# Patient Record
Sex: MALE | Race: WHITE
[De-identification: names, ages, dates, MRNs, and addresses within clinical notes are randomized per-mention and may not be internally consistent; named-entity substitution may affect disease eponyms.]

---

## 2023-06-22 VITALS
SYSTOLIC BLOOD PRESSURE: 139 MMHG | DIASTOLIC BLOOD PRESSURE: 81 MMHG | OXYGEN SATURATION: 97 % | HEART RATE: 76 BPM | RESPIRATION RATE: 18 BRPM | TEMPERATURE: 97.9 F

## 2023-06-22 VITALS
DIASTOLIC BLOOD PRESSURE: 75 MMHG | HEART RATE: 83 BPM | OXYGEN SATURATION: 96 % | SYSTOLIC BLOOD PRESSURE: 134 MMHG | RESPIRATION RATE: 18 BRPM

## 2023-06-22 NOTE — PC.NURSE
Patient tolerated Mitomycin without difficulty, Banda was drained and removed, patient tolerated well. His vitals were obtained and stable, he was discharged home without any complaints.

## 2023-06-22 NOTE — PC.NURSE
1045: Patient is here for instillation of mitomycin, he previously has tolerated these well in the past. He denies concerns or complaints today. Banda was placed via sterile technique and tolerated well. Patient has Mitomycin and turning every 15 
minutes per physician order and is tolerating well.

## 2023-07-20 ENCOUNTER — HOSPITAL ENCOUNTER
Dept: HOSPITAL 101 - INF | Age: 83
Discharge: HOME | End: 2023-07-20
Payer: MEDICARE

## 2023-07-20 VITALS
RESPIRATION RATE: 18 BRPM | TEMPERATURE: 97.7 F | SYSTOLIC BLOOD PRESSURE: 143 MMHG | OXYGEN SATURATION: 97 % | HEART RATE: 90 BPM | DIASTOLIC BLOOD PRESSURE: 91 MMHG

## 2023-07-20 DIAGNOSIS — Z85.51: Primary | ICD-10-CM

## 2023-07-20 PROCEDURE — 51700 IRRIGATION OF BLADDER: CPT

## 2023-07-20 NOTE — PC.NURSE
Patient tolerated treatment well and denies any complaints. Mitomycin was drained into mack bag that was light purple in color and about 350 total cc in drainage bag. Mack was discontinued and patient tolerated well. Pt was reminded of home care 
instructions and he was discharged home without complaints.

## 2023-07-20 NOTE — PC.NURSE
Patient is here for intravesicular mitomycin, he denies any complaints. Vitals are stable. He tolerated insertion of mack well and had 100cc of yellow drainage in bag. Mitomycin was instilled at 1100, and double verified with GENNARO Swanson RN. He is 
instructed to turn every 15 minutes for 90 minutes per physician order. He is tolerating this well.

## 2023-07-26 ENCOUNTER — HOSPITAL ENCOUNTER
Dept: HOSPITAL 101 - INF | Age: 83
Discharge: HOME | End: 2023-07-26
Payer: MEDICARE

## 2023-07-26 DIAGNOSIS — C67.9: Primary | ICD-10-CM

## 2023-07-26 PROCEDURE — 51702 INSERT TEMP BLADDER CATH: CPT

## 2023-07-26 PROCEDURE — 51700 IRRIGATION OF BLADDER: CPT

## 2024-02-01 PROBLEM — I10 ESSENTIAL HYPERTENSION: Status: ACTIVE | Noted: 2024-02-01

## 2024-02-01 PROBLEM — C80.1 CANCER (MULTI): Status: ACTIVE | Noted: 2024-02-01

## 2024-02-01 PROBLEM — K44.9 HIATAL HERNIA: Status: ACTIVE | Noted: 2024-02-01

## 2024-02-01 PROBLEM — I50.31: Status: ACTIVE | Noted: 2024-02-01

## 2024-02-01 PROBLEM — I25.5 ISCHEMIC CARDIOMYOPATHY: Status: ACTIVE | Noted: 2024-02-01

## 2024-02-01 PROBLEM — R07.9 INTERMITTENT CHEST PAIN: Status: ACTIVE | Noted: 2024-02-01

## 2024-02-01 PROBLEM — R13.10 DYSPHAGIA: Status: ACTIVE | Noted: 2024-02-01

## 2024-02-01 PROBLEM — I49.3 PREMATURE VENTRICULAR CONTRACTIONS: Status: ACTIVE | Noted: 2024-02-01

## 2024-02-01 PROBLEM — I25.10 ATHEROSCLEROTIC HEART DISEASE OF NATIVE CORONARY ARTERY WITHOUT ANGINA PECTORIS: Status: ACTIVE | Noted: 2024-02-01

## 2024-02-01 PROBLEM — I25.2 HISTORY OF MI (MYOCARDIAL INFARCTION): Status: ACTIVE | Noted: 2024-02-01

## 2024-02-01 PROBLEM — E78.2 MIXED HYPERLIPIDEMIA: Status: ACTIVE | Noted: 2024-02-01

## 2024-02-01 RX ORDER — ATORVASTATIN CALCIUM 10 MG/1
1 TABLET, FILM COATED ORAL NIGHTLY
COMMUNITY
Start: 2015-09-03 | End: 2024-04-29

## 2024-02-01 RX ORDER — CARVEDILOL 6.25 MG/1
1 TABLET ORAL 2 TIMES DAILY
COMMUNITY
Start: 2021-06-28 | End: 2024-03-22

## 2024-02-01 RX ORDER — TIOTROPIUM BROMIDE 18 UG/1
CAPSULE ORAL; RESPIRATORY (INHALATION)
COMMUNITY
Start: 2015-08-17

## 2024-02-01 RX ORDER — MOMETASONE FUROATE AND FORMOTEROL FUMARATE DIHYDRATE 200; 5 UG/1; UG/1
1 AEROSOL RESPIRATORY (INHALATION)
COMMUNITY
Start: 2021-05-11

## 2024-03-12 ENCOUNTER — HOSPITAL ENCOUNTER
Dept: HOSPITAL 101 - PST | Age: 84
Discharge: HOME | End: 2024-03-12
Payer: MEDICARE

## 2024-03-12 VITALS
RESPIRATION RATE: 16 BRPM | HEART RATE: 79 BPM | DIASTOLIC BLOOD PRESSURE: 70 MMHG | OXYGEN SATURATION: 96 % | TEMPERATURE: 97.34 F | SYSTOLIC BLOOD PRESSURE: 128 MMHG

## 2024-03-12 DIAGNOSIS — Z01.812: ICD-10-CM

## 2024-03-12 DIAGNOSIS — N40.1: ICD-10-CM

## 2024-03-12 DIAGNOSIS — Z01.810: Primary | ICD-10-CM

## 2024-03-12 LAB
ADD MANUAL DIFF: NO
ANION GAP: 12.6
BLOOD UREA NITROGEN: 16 MG/DL (ref 7–18)
CALCIUM: 8.8 MG/DL (ref 8.5–10.1)
CARBON DIOXIDE: 25.7 MMOL/L (ref 21–32)
CHLORIDE: 105 MMOL/L (ref 98–107)
ESTIMATED GFR (AFRICAN AMERICA: >60 (ref 60–?)
ESTIMATED GFR (NON-AFRICAN AME: >60 (ref 60–?)
GLUCOSE: 103 MG/DL (ref 74–106)
HEMATOCRIT: 41.9 % (ref 42–54)
HEMOGLOBIN: 13.8 G/DL (ref 14–18)
IMMATURE GRANULOCYTES ABS AUTO: 0.03 10^3/UL (ref 0–0.03)
IMMATURE GRANULOCYTES PCT AUTO: 0.4 % (ref 0–0.5)
INR: 1
LYMPHOCYTES  ABSOLUTE AUTO: 1.4 10^3/UL (ref 1.2–3.8)
MCV RBC: 98.4 FL (ref 80–94)
MEAN CORPUSCULAR HEMOGLOBIN: 32.4 PG (ref 25.9–34)
MEAN CORPUSCULAR HGB CONC: 32.9 G/DL (ref 29.9–35.2)
PARTIAL THROMBOPLASTIN TIME: 28.2 SEC (ref 22.3–36.2)
PLATELET COUNT: 233 10^3/UL (ref 150–450)
POTASSIUM: 4.3 MMOL/L (ref 3.5–5.1)
PROTHROMBIN TIME: 10.6 SEC (ref 9–11.6)
RED BLOOD COUNT: 4.26 10^6/UL (ref 4.7–6.1)
SODIUM: 139 MMOL/L (ref 136–145)
WHITE BLOOD COUNT: 7.5 10^3/UL (ref 4–11)

## 2024-03-12 PROCEDURE — 36415 COLL VENOUS BLD VENIPUNCTURE: CPT

## 2024-03-12 PROCEDURE — 85730 THROMBOPLASTIN TIME PARTIAL: CPT

## 2024-03-12 PROCEDURE — 93005 ELECTROCARDIOGRAM TRACING: CPT

## 2024-03-12 PROCEDURE — 80048 BASIC METABOLIC PNL TOTAL CA: CPT

## 2024-03-12 PROCEDURE — 85610 PROTHROMBIN TIME: CPT

## 2024-03-12 PROCEDURE — 85025 COMPLETE CBC W/AUTO DIFF WBC: CPT

## 2024-03-12 PROCEDURE — 71046 X-RAY EXAM CHEST 2 VIEWS: CPT

## 2024-03-12 NOTE — ECG_ITS
[facility] 
                                        
                                       Test Date:    2024 
Pat Name:     Donte Castellanos            Department:    
Patient ID:   [id number]               Room:         - 
Gender:       Male                     Technician:    
:          1940               Requested By: OLVIN MELENDREZ 
Order Number: [id number]              Reading MD:   ADIEL TRUJILLO 
                                 Measurements 
Intervals                              Axis           
Rate:         71                       P:            91 
CO:           236                      QRS:          -81 
QRSD:         98                       T:            50 
QT:           382                                     
QTc:          416                                     
                           Interpretive Statements 
SINUS RHYTHM WITH FIRST DEGREE AV BLOCK 
MARKED LEFT AXIS DEVIATION [QRS AXIS < -30] 
No previous ECG available for comparison 
Electronically Signed On 3- 22:36:07 EDT by ADIEL TRUJILLO

## 2024-03-12 NOTE — XR_ITS
The [address] [address] 
     [phone] 
  
  
Patient Name: 
FELA HERNANDEZ 
  
MRN: [id number]    YOB: 1940    Sex: M 
Assigned Patient Location: [facility] 
Current Patient Location: [facility] 
Accession/Order Number: [id number] 
Exam Date: 3/12/2024  13:39    Report Date: 3/12/2024  15:45 
  
At the request of: 
OLVIN MELENDREZ   
  
Procedure:  XR chest 2V 
  
EXAMINATION: XR chest 2V  
  
HISTORY: Preop exam 
  
COMPARISON: No relevant comparison available.  
  
TECHNIQUE: PA and lateral 
  
FINDINGS: 
LUNGS: Paucity of lung markings in the lung bases possibly representing  
emphysematous change. Scattered pulmonary nodules, size and density suggests  
granulomas 
VASCULATURE: No increased pulmonary vasculature.  
PLEURA: No pneumothorax, effusion, or pleural thickening.  
CARDIAC: No cardiomegaly or cardiac silhouette abnormality.  
MEDIASTINUM: No visible mass or adenopathy.  
BONES: No fracture or visible bone lesion.  
OTHER: Negative.  
  
ORDER #: [id number] XR/XR chest 2V  
IMPRESSION:   
   
No acute cardiopulmonary process  
   
   
Electronically authenticated by: KENYA LOMBARDI   Date: 3/12/2024  15:45

## 2024-03-14 ENCOUNTER — HOSPITAL ENCOUNTER (OUTPATIENT)
Age: 84
Discharge: HOME | End: 2024-03-14
Payer: MEDICARE

## 2024-03-14 VITALS
DIASTOLIC BLOOD PRESSURE: 77 MMHG | HEART RATE: 81 BPM | RESPIRATION RATE: 18 BRPM | SYSTOLIC BLOOD PRESSURE: 132 MMHG | OXYGEN SATURATION: 97 %

## 2024-03-14 VITALS
HEART RATE: 81 BPM | RESPIRATION RATE: 16 BRPM | OXYGEN SATURATION: 99 % | DIASTOLIC BLOOD PRESSURE: 70 MMHG | SYSTOLIC BLOOD PRESSURE: 127 MMHG

## 2024-03-14 VITALS
RESPIRATION RATE: 20 BRPM | DIASTOLIC BLOOD PRESSURE: 78 MMHG | SYSTOLIC BLOOD PRESSURE: 128 MMHG | HEART RATE: 82 BPM | OXYGEN SATURATION: 99 %

## 2024-03-14 VITALS
TEMPERATURE: 97.34 F | SYSTOLIC BLOOD PRESSURE: 150 MMHG | HEART RATE: 85 BPM | RESPIRATION RATE: 20 BRPM | DIASTOLIC BLOOD PRESSURE: 88 MMHG | OXYGEN SATURATION: 100 %

## 2024-03-14 VITALS
SYSTOLIC BLOOD PRESSURE: 119 MMHG | RESPIRATION RATE: 23 BRPM | OXYGEN SATURATION: 98 % | DIASTOLIC BLOOD PRESSURE: 72 MMHG | HEART RATE: 81 BPM

## 2024-03-14 VITALS
OXYGEN SATURATION: 97 % | RESPIRATION RATE: 15 BRPM | SYSTOLIC BLOOD PRESSURE: 129 MMHG | HEART RATE: 80 BPM | DIASTOLIC BLOOD PRESSURE: 76 MMHG

## 2024-03-14 VITALS — BODY MASS INDEX: 19 KG/M2

## 2024-03-14 VITALS
OXYGEN SATURATION: 99 % | RESPIRATION RATE: 16 BRPM | DIASTOLIC BLOOD PRESSURE: 70 MMHG | HEART RATE: 81 BPM | TEMPERATURE: 97.1 F | SYSTOLIC BLOOD PRESSURE: 127 MMHG

## 2024-03-14 VITALS
SYSTOLIC BLOOD PRESSURE: 124 MMHG | RESPIRATION RATE: 17 BRPM | HEART RATE: 81 BPM | DIASTOLIC BLOOD PRESSURE: 73 MMHG | OXYGEN SATURATION: 97 %

## 2024-03-14 VITALS
OXYGEN SATURATION: 98 % | RESPIRATION RATE: 15 BRPM | DIASTOLIC BLOOD PRESSURE: 71 MMHG | SYSTOLIC BLOOD PRESSURE: 121 MMHG | HEART RATE: 81 BPM

## 2024-03-14 VITALS
RESPIRATION RATE: 20 BRPM | SYSTOLIC BLOOD PRESSURE: 127 MMHG | DIASTOLIC BLOOD PRESSURE: 69 MMHG | OXYGEN SATURATION: 96 % | HEART RATE: 80 BPM

## 2024-03-14 VITALS
RESPIRATION RATE: 16 BRPM | SYSTOLIC BLOOD PRESSURE: 128 MMHG | OXYGEN SATURATION: 99 % | HEART RATE: 82 BPM | DIASTOLIC BLOOD PRESSURE: 78 MMHG

## 2024-03-14 DIAGNOSIS — N21.0: ICD-10-CM

## 2024-03-14 DIAGNOSIS — E78.5: ICD-10-CM

## 2024-03-14 DIAGNOSIS — C67.3: Primary | ICD-10-CM

## 2024-03-14 DIAGNOSIS — R06.02: ICD-10-CM

## 2024-03-14 DIAGNOSIS — R31.29: ICD-10-CM

## 2024-03-14 DIAGNOSIS — I50.9: ICD-10-CM

## 2024-03-14 DIAGNOSIS — Z85.51: ICD-10-CM

## 2024-03-14 DIAGNOSIS — I11.0: ICD-10-CM

## 2024-03-14 DIAGNOSIS — N40.1: ICD-10-CM

## 2024-03-14 DIAGNOSIS — Z87.440: ICD-10-CM

## 2024-03-14 DIAGNOSIS — J44.9: ICD-10-CM

## 2024-03-14 DIAGNOSIS — Z79.01: ICD-10-CM

## 2024-03-14 DIAGNOSIS — Z87.891: ICD-10-CM

## 2024-03-14 PROCEDURE — 36415 COLL VENOUS BLD VENIPUNCTURE: CPT

## 2024-03-14 PROCEDURE — 99999: CPT

## 2024-03-14 PROCEDURE — 88307 TISSUE EXAM BY PATHOLOGIST: CPT

## 2024-03-14 PROCEDURE — 52234 CYSTOSCOPY AND TREATMENT: CPT

## 2024-03-14 NOTE — PM.URSON
Urology Surgery Operative Note
Operative Note
Procedure Date: 03/14/24
Time Out Performed: yes
Pre-op Diagnosis: Recurrent bladder tumors
Post-op Diagnosis: same as pre-op
Procedures performed: 1.  Cystoscopy. 
2.  Transurethral resection of bladder tumor approximately 2 cm
Anesthesia: EMILY
Primary Surgeon: Ricky Orellana
Complications: 
None
Estimated blood loss (mL): 2
Specimens: 
Bladder tumor
Drains: 
18 [place] Banda catheter to the bladder
Indications for Procedures: 
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  On his last endoscopy he was found to have a 2 cm tumor on the anterior left wall.  He now presents for TURBT.  He has signed an informed consent for these procedures after 
risks were explained.
Detailed description of Procedure: 
The patient was brought to the operating room and placed on the operating room table in the supine position. SCDs were placed on the lower extremities and turned on and functioning during the entire case. Timeout was done by all parties in the room. 
We all agreed upon the patient's identification and the planned procedures for this patient. Genn. anesthesia was then administered. The patient was then repositioned into the modified dorsal lithotomy position. All pressure points were 
satisfactorily padded. Genitalia were sterilely prepped and draped in usual fashion.  I started by passing a 26 [place] Olympus resectoscope with a standard bipolar loop electrode per urethra and into the bladder.  The anterior urethra was normal.  
Prostatic urethra showed by lobar hypertrophy.  Panendoscopy in the bladder showed the 1 noted tumor on office cystoscopy on the anterior left-sided wall.  There were diverticuli diffusely.  No other tumors were noted.

I then began to resect the tumor.  I did it in a staccato fashion due to his thin-walled bladder.  The entire tumor was removed and sent for permanent sections.  The resection bed was fulgurated with the loop electrode.  Upon completion, there was 
no evidence of bleeding.  There were no other tumors noted in the bladder.  The scope was then removed.  I then placed an 18 [place] Banda in the bladder.  It drained clear.  The anesthetic was then reversed.  He was then transferred to a [facility] bed 
and wheeled to PACU in stable condition.

## 2024-03-14 NOTE — P.URON_ITS
Urology Surgery Operative Note    
Operative Note    
Procedure Date: 03/14/24    
Time Out Performed: yes    
Pre-op Diagnosis: Recurrent bladder tumors    
Post-op Diagnosis: same as pre-op    
Procedures performed: 1.  Cystoscopy.     
2.  Transurethral resection of bladder tumor approximately 2 cm    
Anesthesia: EMILY    
Primary Surgeon: Ricky Orelalna    
Complications:     
None    
Estimated blood loss (mL): 2    
Specimens:     
Bladder tumor    
Drains:     
18 [place] Banda catheter to the bladder    
Indications for Procedures:     
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  On   
his last endoscopy he was found to have a 2 cm tumor on the anterior left wall.   
He now presents for TURBT.  He has signed an informed consent for these   
procedures after risks were explained.    
Detailed description of Procedure:     
The patient was brought to the operating room and placed on the operating room   
table in the supine position. SCDs were placed on the lower extremities and   
turned on and functioning during the entire case. Timeout was done by all   
parties in the room. We all agreed upon the patient's identification and the   
planned procedures for this patient. Genn. anesthesia was then administered. The  
patient was then repositioned into the modified dorsal lithotomy position. All   
pressure points were satisfactorily padded. Genitalia were sterilely prepped and  
draped in usual fashion.  I started by passing a 26 [place] Olympus resectoscope   
with a standard bipolar loop electrode per urethra and into the bladder.  The   
anterior urethra was normal.  Prostatic urethra showed by lobar hypertrophy.    
Panendoscopy in the bladder showed the 1 noted tumor on office cystoscopy on the  
anterior left-sided wall.  There were diverticuli diffusely.  No other tumors   
were noted.    
    
I then began to resect the tumor.  I did it in a staccato fashion due to his   
thin-walled bladder.  The entire tumor was removed and sent for permanent   
sections.  The resection bed was fulgurated with the loop electrode.  Upon   
completion, there was no evidence of bleeding.  There were no other tumors noted  
in the bladder.  The scope was then removed.  I then placed an 18 [place] Banda   
in the bladder.  It drained clear.  The anesthetic was then reversed.  He was   
then transferred to a [facility] bed and wheeled to PACU in stable condition.

## 2024-04-05 ENCOUNTER — TELEPHONE (OUTPATIENT)
Dept: CARDIOLOGY | Facility: CLINIC | Age: 84
End: 2024-04-05
Payer: MEDICARE

## 2024-04-05 NOTE — TELEPHONE ENCOUNTER
Left patient voicemail advising appointment with Dr. Kathy Farooq MD has been canceled as provider will not be in office. New appointment has been made: 4/29 @ 1:15 - Asked patient to return call if this will not work.

## 2024-04-08 ENCOUNTER — APPOINTMENT (OUTPATIENT)
Dept: CARDIOLOGY | Facility: CLINIC | Age: 84
End: 2024-04-08
Payer: MEDICARE

## 2024-04-26 DIAGNOSIS — E78.2 MIXED HYPERLIPIDEMIA: ICD-10-CM

## 2024-04-29 ENCOUNTER — OFFICE VISIT (OUTPATIENT)
Dept: CARDIOLOGY | Facility: CLINIC | Age: 84
End: 2024-04-29
Payer: MEDICARE

## 2024-04-29 VITALS
WEIGHT: 118 LBS | SYSTOLIC BLOOD PRESSURE: 138 MMHG | HEART RATE: 68 BPM | DIASTOLIC BLOOD PRESSURE: 80 MMHG | BODY MASS INDEX: 19.05 KG/M2

## 2024-04-29 DIAGNOSIS — I25.10 ATHEROSCLEROSIS OF NATIVE CORONARY ARTERY WITHOUT ANGINA PECTORIS, UNSPECIFIED WHETHER NATIVE OR TRANSPLANTED HEART: ICD-10-CM

## 2024-04-29 DIAGNOSIS — Z87.891 FORMER SMOKER: ICD-10-CM

## 2024-04-29 DIAGNOSIS — R07.9 INTERMITTENT CHEST PAIN: ICD-10-CM

## 2024-04-29 DIAGNOSIS — I25.5 ISCHEMIC CARDIOMYOPATHY: ICD-10-CM

## 2024-04-29 DIAGNOSIS — I10 ESSENTIAL HYPERTENSION: ICD-10-CM

## 2024-04-29 DIAGNOSIS — E78.2 MIXED HYPERLIPIDEMIA: ICD-10-CM

## 2024-04-29 PROCEDURE — 3079F DIAST BP 80-89 MM HG: CPT | Performed by: INTERNAL MEDICINE

## 2024-04-29 PROCEDURE — 3075F SYST BP GE 130 - 139MM HG: CPT | Performed by: INTERNAL MEDICINE

## 2024-04-29 PROCEDURE — 1160F RVW MEDS BY RX/DR IN RCRD: CPT | Performed by: INTERNAL MEDICINE

## 2024-04-29 PROCEDURE — 1159F MED LIST DOCD IN RCRD: CPT | Performed by: INTERNAL MEDICINE

## 2024-04-29 PROCEDURE — 99214 OFFICE O/P EST MOD 30 MIN: CPT | Performed by: INTERNAL MEDICINE

## 2024-04-29 RX ORDER — CARVEDILOL 6.25 MG/1
6.25 TABLET ORAL 2 TIMES DAILY
Qty: 180 TABLET | Refills: 0 | Status: SHIPPED | OUTPATIENT
Start: 2024-04-29

## 2024-04-29 RX ORDER — ATORVASTATIN CALCIUM 10 MG/1
10 TABLET, FILM COATED ORAL NIGHTLY
Qty: 90 TABLET | Refills: 3 | Status: SHIPPED | OUTPATIENT
Start: 2024-04-29 | End: 2025-04-29

## 2024-04-29 NOTE — LETTER
June 18, 2024     Saumya Griffith, APRN-CNP  [address] [facility] [address]    Patient: Wilson Suresh   YOB: 1940   Date of Visit: 4/29/2024       Dear Dr. Saumya Griffith, APRN-CNP:    Thank you for referring Wilson Suresh to me for evaluation. Below are my notes for this consultation.  If you have questions, please do not hesitate to call me. I look forward to following your patient along with you.       Sincerely,     Kathy Farooq MD      CC: No Recipients  ______________________________________________________________________________________    1 year follow-up    Subjective :   Patient with mild nonocclusive CAD, returns for 1 year follow-up, past cardiac history is as noted below, interval review of systems is negative for chest discomfort pressure tightness heaviness palpitations lightheadedness orthopnea paroxysmal nocturnal dyspnea dependent edema or claudication TIA or CVA type symptoms or bleeding diathesis        History so Far :   • Atherosclerotic heart disease of native coronary artery without angina pectoris (414.01)  (I25.10)   • 2002 Cardiac cath   mLAD 25%   CX 20%   RCA normal   Daily activity 4 METs without concerning symptoms   • Normal cardiac ejection fraction   • Nov 2021 Echo   LVEF 55-60%   MR mild   Pulm Htn moderate   • Essential hypertension (401.9) (I10)   • Low BP in office   dizziness   will reduce coreg   • Mixed hyperlipidemia (272.2) (E78.2)   • Moderate intensity statin   Will be getting annual wellness labs   • Body mass index (BMI) of 19.9 or less in adult (Z68.1)    Objective   Wt Readings from Last 3 Encounters:   04/29/24 53.5 kg (118 lb)   04/06/23 55.8 kg (123 lb)   10/04/22 55.3 kg (122 lb)            Vitals:    04/29/24 1326   BP: 138/80   BP Location: Right arm   Patient Position: Sitting   Pulse: 68   Weight: 53.5 kg (118 lb)                Physical Exam:    GENERAL APPEARANCE: in no acute distress.  CHEST: Symmetric and non-tender.  INTEGUMENT:  Skin warm and dry  HEENT: No gross abnormalities identified.No pallor or scleral icterus.  NECK: Supple, no JVD, no bruit.   NEURO/PSHCY: Alert and oriented x3; appropriate behavior and responses and responses  LUNGS: Clear to auscultation bilaterally; normal respiratory effort.  HEART: Rate and rhythm regular with no evident murmur; no gallop appreciated.   ABDOMEN: Soft, non tender.  MUSCULOSKELETAL: No gross deformities.  EXTREMITIES: Warm  There is no edema noted.    Meds:  Current Outpatient Medications   Medication Instructions   • aspirin-calcium carbonate 81 mg-300 mg calcium(777 mg) tablet oral   • atorvastatin (LIPITOR) 10 mg, oral, Nightly   • carvedilol (COREG) 6.25 mg, oral, 2 times daily   • mometasone-formoterol (Dulera) 200-5 mcg/actuation inhaler 1 puff, inhalation, 2 times daily RT   • tiotropium (Spiriva with HandiHaler) 18 mcg inhalation capsule inhalation, Daily RT          No Known Allergies    LABS:    Reviewed laboratory data from April 2024, hemoglobin 12.8 hematocrit 38  Sodium 140, potassium 4.0 creatinine 1.07 GFR greater than 60 hemoglobin A1c 6.1 liver enzymes are normal triglycerides 106, total cholesterol 101, LDL 35, HDL 45, total cholesterol to HDL ratio 2.2.      Patient Active Problem List    Diagnosis Date Noted   • BMI less than 19,adult 04/29/2024   • Former smoker 04/29/2024   • Acute heart failure with normal ejection fraction (Multi) 02/01/2024   • Atherosclerotic heart disease of native coronary artery without angina pectoris 02/01/2024   • Cancer (Multi) 02/01/2024   • Dysphagia 02/01/2024   • Essential hypertension 02/01/2024   • History of MI (myocardial infarction) 02/01/2024   • Hiatal hernia 02/01/2024   • Intermittent chest pain 02/01/2024   • Ischemic cardiomyopathy 02/01/2024   • Mixed hyperlipidemia 02/01/2024   • Premature ventricular contractions 02/01/2024                 Assessment:    1. Atherosclerosis of native coronary artery without angina pectoris,  unspecified whether native or transplanted heart  carvedilol (Coreg) 6.25 mg tablet      2. Essential hypertension  carvedilol (Coreg) 6.25 mg tablet      3. Ischemic cardiomyopathy  Comprehensive Metabolic Panel    Lipid Panel    carvedilol (Coreg) 6.25 mg tablet    Follow Up In Cardiology      4. Mixed hyperlipidemia  Lipid Panel      5. Intermittent chest pain        6. BMI less than 19,adult        7. Former smoker        Clinical decision making:    This patient is doing well clinically, blood pressure is at target, has not had any lightheadedness or symptomatic arterial hypotension, he does have a history of labile blood pressure.  Lipid profile is at target.  No changes in medications.   Follow up : 1 year    Comprehensive  profile and lipid profile prior to next visit  Cardiac medications were refilled.    Provider Attestation - Scribe documentation    All medical record entries made by the Scribe were at my direction and personally dictated by me. I have reviewed the chart and agree that the record accurately reflects my personal performance of the history, physical exam, discussion and plan.

## 2024-04-29 NOTE — PROGRESS NOTES
Subjective   Wilson Suresh is a 83 y.o. male       Chief Complaint    Annual Exam          HPI     Review of Systems   Cardiovascular:  Positive for chest pain.            Vitals:    04/29/24 1326   BP: 138/80   BP Location: Right arm   Patient Position: Sitting   Pulse: 68   Weight: 53.5 kg (118 lb)        Objective   Physical Exam    Allergies  Patient has no known allergies.     Current Medications    Current Outpatient Medications:     aspirin-calcium carbonate 81 mg-300 mg calcium(777 mg) tablet, Take by mouth., Disp: , Rfl:     atorvastatin (Lipitor) 10 mg tablet, Take 1 tablet (10 mg) by mouth once daily at bedtime., Disp: , Rfl:     carvedilol (Coreg) 6.25 mg tablet, TAKE 1 TABLET BY MOUTH TWICE  DAILY, Disp: 180 tablet, Rfl: 0    mometasone-formoterol (Dulera) 200-5 mcg/actuation inhaler, Inhale 1 puff 2 times a day., Disp: , Rfl:     tiotropium (Spiriva with HandiHaler) 18 mcg inhalation capsule, Place into inhaler and inhale once daily., Disp: , Rfl:                      Assessment/Plan   No diagnosis found.         Scribe Attestation  By signing my name below, I, *** , Scribe   attest that this documentation has been prepared under the direction and in the presence of Kathy Farooq MD.     Provider Attestation - Scribe documentation    All medical record entries made by the Scribe were at my direction and personally dictated by me. I have reviewed the chart and agree that the record accurately reflects my personal performance of the history, physical exam, discussion and plan.

## 2024-04-29 NOTE — PROGRESS NOTES
1 year follow-up    Subjective :   Patient with mild nonocclusive CAD, returns for 1 year follow-up, past cardiac history is as noted below, interval review of systems is negative for chest discomfort pressure tightness heaviness palpitations lightheadedness orthopnea paroxysmal nocturnal dyspnea dependent edema or claudication TIA or CVA type symptoms or bleeding diathesis        History so Far :    Atherosclerotic heart disease of native coronary artery without angina pectoris (414.01)  (I25.10)    2002 Cardiac cath   mLAD 25%   CX 20%   RCA normal   Daily activity 4 METs without concerning symptoms    Normal cardiac ejection fraction    Nov 2021 Echo   LVEF 55-60%   MR mild   Pulm Htn moderate    Essential hypertension (401.9) (I10)    Low BP in office   dizziness   will reduce coreg    Mixed hyperlipidemia (272.2) (E78.2)    Moderate intensity statin   Will be getting annual wellness labs    Body mass index (BMI) of 19.9 or less in adult (Z68.1)    Objective   Wt Readings from Last 3 Encounters:   04/29/24 53.5 kg (118 lb)   04/06/23 55.8 kg (123 lb)   10/04/22 55.3 kg (122 lb)            Vitals:    04/29/24 1326   BP: 138/80   BP Location: Right arm   Patient Position: Sitting   Pulse: 68   Weight: 53.5 kg (118 lb)                Physical Exam:    GENERAL APPEARANCE: in no acute distress.  CHEST: Symmetric and non-tender.  INTEGUMENT: Skin warm and dry  HEENT: No gross abnormalities identified.No pallor or scleral icterus.  NECK: Supple, no JVD, no bruit.   NEURO/PSHCY: Alert and oriented x3; appropriate behavior and responses and responses  LUNGS: Clear to auscultation bilaterally; normal respiratory effort.  HEART: Rate and rhythm regular with no evident murmur; no gallop appreciated.   ABDOMEN: Soft, non tender.  MUSCULOSKELETAL: No gross deformities.  EXTREMITIES: Warm  There is no edema noted.    Meds:  Current Outpatient Medications   Medication Instructions    aspirin-calcium carbonate 81 mg-300 mg  calcium(777 mg) tablet oral    atorvastatin (LIPITOR) 10 mg, oral, Nightly    carvedilol (COREG) 6.25 mg, oral, 2 times daily    mometasone-formoterol (Dulera) 200-5 mcg/actuation inhaler 1 puff, inhalation, 2 times daily RT    tiotropium (Spiriva with HandiHaler) 18 mcg inhalation capsule inhalation, Daily RT          No Known Allergies    LABS:    Reviewed laboratory data from April 2024, hemoglobin 12.8 hematocrit 38  Sodium 140, potassium 4.0 creatinine 1.07 GFR greater than 60 hemoglobin A1c 6.1 liver enzymes are normal triglycerides 106, total cholesterol 101, LDL 35, HDL 45, total cholesterol to HDL ratio 2.2.      Patient Active Problem List    Diagnosis Date Noted    BMI less than 19,adult 04/29/2024    Former smoker 04/29/2024    Acute heart failure with normal ejection fraction (Multi) 02/01/2024    Atherosclerotic heart disease of native coronary artery without angina pectoris 02/01/2024    Cancer (Multi) 02/01/2024    Dysphagia 02/01/2024    Essential hypertension 02/01/2024    History of MI (myocardial infarction) 02/01/2024    Hiatal hernia 02/01/2024    Intermittent chest pain 02/01/2024    Ischemic cardiomyopathy 02/01/2024    Mixed hyperlipidemia 02/01/2024    Premature ventricular contractions 02/01/2024                 Assessment:    1. Atherosclerosis of native coronary artery without angina pectoris, unspecified whether native or transplanted heart  carvedilol (Coreg) 6.25 mg tablet      2. Essential hypertension  carvedilol (Coreg) 6.25 mg tablet      3. Ischemic cardiomyopathy  Comprehensive Metabolic Panel    Lipid Panel    carvedilol (Coreg) 6.25 mg tablet    Follow Up In Cardiology      4. Mixed hyperlipidemia  Lipid Panel      5. Intermittent chest pain        6. BMI less than 19,adult        7. Former smoker        Clinical decision making:    This patient is doing well clinically, blood pressure is at target, has not had any lightheadedness or symptomatic arterial hypotension, he does  have a history of labile blood pressure.  Lipid profile is at target.  No changes in medications.   Follow up : 1 year    Comprehensive  profile and lipid profile prior to next visit  Cardiac medications were refilled.    Provider Attestation - Scribe documentation    All medical record entries made by the Scribe were at my direction and personally dictated by me. I have reviewed the chart and agree that the record accurately reflects my personal performance of the history, physical exam, discussion and plan.

## 2024-05-08 LAB
NON-UH HIE ALANINE AMINOTRANSFERASE: 15 U/L (ref 7–52)
NON-UH HIE ALBUMIN LEVEL: 3.7 G/DL (ref 3.5–5.7)
NON-UH HIE ALBUMIN/GLOBULIN RATIO: 1.7
NON-UH HIE ALKALINE PHOSPHATASE: 94 U/L (ref 34–104)
NON-UH HIE ANION GAP: 7.3 (ref 6–15)
NON-UH HIE ASPARTATE AMINO TRANSFERASE: 16 U/L (ref 13–39)
NON-UH HIE BILIRUBIN,TOTAL: 0.5 MG/DL (ref 0.3–1)
NON-UH HIE BLOOD UREA NITROGEN: 14 MG/DL (ref 7–25)
NON-UH HIE CALCIUM: 9.1 MG/DL (ref 8.6–10.3)
NON-UH HIE CARBON DIOXIDE: 27.9 MMOL/L (ref 21–31)
NON-UH HIE CHLORIDE: 109 MMOL/L (ref 98–107)
NON-UH HIE CHOL/HDL RATIO: 2
NON-UH HIE CHOLESTEROL: 104 MG/DL (ref 140–200)
NON-UH HIE CREATININE: 1.14 MG/DL (ref 0.7–1.3)
NON-UH HIE ESTIMATED GFR: > 60
NON-UH HIE GLOBULIN: 2.2 G/DL
NON-UH HIE GLUCOSE: 105 MG/DL (ref 70–100)
NON-UH HIE HDL CHOLESTEROL: 53 MG/DL (ref 23–92)
NON-UH HIE LDL CHOLESTEROL,CALCULATED: 27 MG/DL (ref 0–100)
NON-UH HIE POTASSIUM: 4.2 MMOL/L (ref 3.5–5.1)
NON-UH HIE SODIUM: 140 MMOL/L (ref 136–145)
NON-UH HIE TOTAL PROTEIN: 5.9 G/DL (ref 6.4–8.9)
NON-UH HIE TRIGLYCERIDE W/REFLEX: 122 MG/DL (ref 0–149)
NON-UH HIE VLDL CHOLESTEROL: 24 MG/DL

## 2024-12-13 NOTE — PATIENT INSTRUCTIONS
Please bring all medicines, vitamins, and herbal supplements with you when you come to the office.    Prescriptions will not be filled unless you are compliant with your follow up appointments or have a follow up appointment scheduled as per instruction of your physician. Refills should be requested at the time of your visit. Fall Prevention Education Given   
Statement Selected

## 2025-01-17 PROCEDURE — 93306 TTE W/DOPPLER COMPLETE: CPT | Performed by: INTERNAL MEDICINE

## 2025-01-23 NOTE — H&P (VIEW-ONLY)
HPI:   Wilson Suresh is a 84 y.o. 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  This patient presented with 6 months of dysphagia to solid food.  He lost about 10 pounds since then.  He is currently only tolerating soft diet.  He is not able to tolerate bread or meat.  Food stuck in the chest and required regurgitation.  At baseline, he could walk about 1 mile every day.  He denies some dyspnea exertion.  Patient has a laparoscopic paraesophageal repair with mesh at  in 2016 for incarcerated [facility].  He does have a history of MI with 4 stent placed in the 1980s.  He has lost to follow-up with cardiologist.  He denies any recent chest pain or chest pressure.  He had episode of TIA 20 years ago with complete regain neurologic function.  He was smoker for 40 years with 2 pack/day but quit 35 years ago.  He denies any recent fever, chill, chest pain, chest pressure, short of breath, nausea or emesis.    PMHx: per HPI  PSHx: hiatal hernia repair with absorbable mesh in 2016, PCI, open appendectomy, EGD  SHx: former smoker (80ppy quit 35 year ago), deny ETOH, or illicit drugs   FMHx: father has lung cancer. MI and CAD in family.    Current Outpatient Medications:     aspirin-calcium carbonate 81 mg-300 mg calcium(777 mg) tablet, Take by mouth., Disp: , Rfl:     atorvastatin (Lipitor) 10 mg tablet, Take 1 tablet (10 mg) by mouth once daily at bedtime., Disp: 90 tablet, Rfl: 3    carvedilol (Coreg) 6.25 mg tablet, Take 1 tablet (6.25 mg) by mouth 2 times a day., Disp: 180 tablet, Rfl: 0    mometasone-formoterol (Dulera) 200-5 mcg/actuation inhaler, Inhale 1 puff 2 times a day., Disp: , Rfl:     tiotropium (Spiriva with HandiHaler) 18 mcg inhalation capsule, Place into inhaler and inhale once daily., Disp: , Rfl:    No Known Allergies       ROS  General: weight  loss, negative for fever, chills, night sweat  Head: negative for severe headache, vision change, blurred vision,   CV: negative for chest pain, dizziness, lightheadedness   Pulm: negative for shortness of breath, dyspnea on exertion, hemoptysis  GI: negative for diarrhea, constipation, abdominal pain, nausea or vomiting, BRBPR  : negative for dysuria, hematuria, incontinence  Skin: negative for rash  Heme: negative for blood thinner, bleeding disorder or clotting disorder  Endo: negative for heat or cold intolerance, weight gain or weight loss  MSK: negative for rash, edema, weakness    PHYSICAL EXAM  Visit Vitals  /67   Pulse 84   Temp 35.4 °C (95.7 °F)     Constitution: well-developed thin appearing male sitting in chair in no acute distress  HEENT: NCAT, moist mucosal membrane, neck supple, no crepitus, sclera anicteric  Lymph nodes: no cervical or supraclavicular lymphadenopathy  Cardiac: RRR, normal S1, S2, no mrg  Pulmonary: normal air movement, CTAP, no wcr  Abdomen: soft, non-distended, non-tender, no rigidity, guarding or rebound tenderness, no splenohepatomegaly, abdominal scars well healed  Neuro: AOx3, CNII-XII grossly normal  Ext: warm, dry, no edema noted  Skin: dry, clean and intact  Psych: mood and affect wnl    Assessment and Plan:  This is a 84 y.o. male former smoker with new found GEJ cancer  I reviewed the CT scan from 12/19/24 and the PET/CT from 1/22/25. It showed large hiatal hernia with more than half the stomach herniated into the chest. There was mass in the gastric cardia from GEJ. This mass was strongly PET-avid. It appeared more than half of the stomach has involvement.  --diffuse emphysematous change of the lungs with one   --multiple mediastinal and hilar lymphadenopathy with calcification and PDG activities  I reviewed his pathology report from 1/8/25. The GEJ biopsy showed invasive adenocarcinoma, moderately differentiated  I reviewed the PFT from 1/17/25. It showed FEV1  94% predicted and DLCO  54% predicted   I reviewed the ECHO from 1/17/25. It showed EF of 50%, RV function is normal, RVSP 44 mmHg  I reviewed the labs from 11/20/24. It showed anemia with Hgb of 11.6 and albumin of 3.1    In my opinion, this patient presented with newly identified GE junction adenocarcinoma.  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  He also has a large recurrent hiatal hernia with the majority of stomach in the chest.  PET/CT does not show distant disease.  The hilar and mediastinal lymph nodes are likely from different disease process. Overall he is a frail gentleman with multiple comorbidities.  A total gastrectomy with esophagojejunostomy in the right chest is a major operation carrying significant comorbidities and mortality.  Especially given his previous hiatus operation with mesh, this operation could be very difficult.  In my opinion, at least he will need reevaluation of this cancer with repeat EGD and possible laparoscopic peritoneal washing to complete staging.  He will also need to see a cardiologist to obtain cardiac clearance given his history of MI and stents.  I will present his case to tumor board to further discussion of treatment plan.      I had a candid discussion with the patient and his family. I outlined the treatment plan as above. The patient consented to proceed.     Joseph Luna MD  Thoracic Surgeon  [facility]   of Medicine  [facility]  Office phone: [phone]  Fax: [phone]  Pager: [phone]    Amount of time spent:  -Prep time on date of patient encounter: 30 min  -Time spend with patient/family/caregiver: 40 min  -Additional time spent on patient care activities: 15 min  -Documentation time in medical record: 15  min  -Other time spent on date of patient encounter: 0 min  Total time: 100 min

## 2025-01-23 NOTE — PROGRESS NOTES
HPI:   Wilson Suresh is a 84 y.o. 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  This patient presented with 6 months of dysphagia to solid food.  He lost about 10 pounds since then.  He is currently only tolerating soft diet.  He is not able to tolerate bread or meat.  Food stuck in the chest and required regurgitation.  At baseline, he could walk about 1 mile every day.  He denies some dyspnea exertion.  Patient has a laparoscopic paraesophageal repair with mesh at  in 2016 for incarcerated PEH.  He does have a history of MI with 4 stent placed in the 1980s.  He has lost to follow-up with cardiologist.  He denies any recent chest pain or chest pressure.  He had episode of TIA 20 years ago with complete regain neurologic function.  He was smoker for 40 years with 2 pack/day but quit 35 years ago.  He denies any recent fever, chill, chest pain, chest pressure, short of breath, nausea or emesis.    PMHx: per HPI  PSHx: hiatal hernia repair with absorbable mesh in 2016, PCI, open appendectomy, EGD  SHx: former smoker (80ppy quit 35 year ago), deny ETOH, or illicit drugs   FMHx: father has lung cancer. MI and CAD in family.    Current Outpatient Medications:     aspirin-calcium carbonate 81 mg-300 mg calcium(777 mg) tablet, Take by mouth., Disp: , Rfl:     atorvastatin (Lipitor) 10 mg tablet, Take 1 tablet (10 mg) by mouth once daily at bedtime., Disp: 90 tablet, Rfl: 3    carvedilol (Coreg) 6.25 mg tablet, Take 1 tablet (6.25 mg) by mouth 2 times a day., Disp: 180 tablet, Rfl: 0    mometasone-formoterol (Dulera) 200-5 mcg/actuation inhaler, Inhale 1 puff 2 times a day., Disp: , Rfl:     tiotropium (Spiriva with HandiHaler) 18 mcg inhalation capsule, Place into inhaler and inhale once daily., Disp: , Rfl:    No Known Allergies       ROS  General: weight  loss, negative for fever, chills, night sweat  Head: negative for severe headache, vision change, blurred vision,   CV: negative for chest pain, dizziness, lightheadedness   Pulm: negative for shortness of breath, dyspnea on exertion, hemoptysis  GI: negative for diarrhea, constipation, abdominal pain, nausea or vomiting, BRBPR  : negative for dysuria, hematuria, incontinence  Skin: negative for rash  Heme: negative for blood thinner, bleeding disorder or clotting disorder  Endo: negative for heat or cold intolerance, weight gain or weight loss  MSK: negative for rash, edema, weakness    PHYSICAL EXAM  Visit Vitals  /67   Pulse 84   Temp 35.4 °C (95.7 °F)     Constitution: well-developed thin appearing male sitting in chair in no acute distress  HEENT: NCAT, moist mucosal membrane, neck supple, no crepitus, sclera anicteric  Lymph nodes: no cervical or supraclavicular lymphadenopathy  Cardiac: RRR, normal S1, S2, no mrg  Pulmonary: normal air movement, CTAP, no wcr  Abdomen: soft, non-distended, non-tender, no rigidity, guarding or rebound tenderness, no splenohepatomegaly, abdominal scars well healed  Neuro: AOx3, CNII-XII grossly normal  Ext: warm, dry, no edema noted  Skin: dry, clean and intact  Psych: mood and affect wnl    Assessment and Plan:  This is a 84 y.o. male former smoker with new found GEJ cancer  I reviewed the CT scan from 12/19/24 and the PET/CT from 1/22/25. It showed large hiatal hernia with more than half the stomach herniated into the chest. There was mass in the gastric cardia from GEJ. This mass was strongly PET-avid. It appeared more than half of the stomach has involvement.  --diffuse emphysematous change of the lungs with one   --multiple mediastinal and hilar lymphadenopathy with calcification and PDG activities  I reviewed his pathology report from 1/8/25. The GEJ biopsy showed invasive adenocarcinoma, moderately differentiated  I reviewed the PFT from 1/17/25. It showed FEV1  94% predicted and DLCO  54% predicted   I reviewed the ECHO from 1/17/25. It showed EF of 50%, RV function is normal, RVSP 44 mmHg  I reviewed the labs from 11/20/24. It showed anemia with Hgb of 11.6 and albumin of 3.1    In my opinion, this patient presented with newly identified GE junction adenocarcinoma.  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  He also has a large recurrent hiatal hernia with the majority of stomach in the chest.  PET/CT does not show distant disease.  The hilar and mediastinal lymph nodes are likely from different disease process. Overall he is a frail gentleman with multiple comorbidities.  A total gastrectomy with esophagojejunostomy in the right chest is a major operation carrying significant comorbidities and mortality.  Especially given his previous hiatus operation with mesh, this operation could be very difficult.  In my opinion, at least he will need reevaluation of this cancer with repeat EGD and possible laparoscopic peritoneal washing to complete staging.  He will also need to see a cardiologist to obtain cardiac clearance given his history of MI and stents.  I will present his case to tumor board to further discussion of treatment plan.      I had a candid discussion with the patient and his family. I outlined the treatment plan as above. The patient consented to proceed.     Joseph Luna MD  Thoracic Surgeon  [facility]   of Medicine  [facility]  Office phone: [phone]  Fax: [phone]  Pager: [phone]    Amount of time spent:  -Prep time on date of patient encounter: 30 min  -Time spend with patient/family/caregiver: 40 min  -Additional time spent on patient care activities: 15 min  -Documentation time in medical record: 15  min  -Other time spent on date of patient encounter: 0 min  Total time: 100 min

## 2025-01-24 ENCOUNTER — OFFICE VISIT (OUTPATIENT)
Dept: SURGERY | Facility: CLINIC | Age: 85
End: 2025-01-24
Payer: MEDICARE

## 2025-01-24 VITALS
WEIGHT: 112 LBS | OXYGEN SATURATION: 95 % | SYSTOLIC BLOOD PRESSURE: 146 MMHG | HEIGHT: 66 IN | HEART RATE: 84 BPM | TEMPERATURE: 95.7 F | DIASTOLIC BLOOD PRESSURE: 67 MMHG | BODY MASS INDEX: 18 KG/M2

## 2025-01-24 DIAGNOSIS — C16.0 ADENOCARCINOMA OF GASTROESOPHAGEAL JUNCTION (MULTI): Primary | ICD-10-CM

## 2025-01-24 PROCEDURE — 1036F TOBACCO NON-USER: CPT | Performed by: STUDENT IN AN ORGANIZED HEALTH CARE EDUCATION/TRAINING PROGRAM

## 2025-01-24 PROCEDURE — 3077F SYST BP >= 140 MM HG: CPT | Performed by: STUDENT IN AN ORGANIZED HEALTH CARE EDUCATION/TRAINING PROGRAM

## 2025-01-24 PROCEDURE — 1159F MED LIST DOCD IN RCRD: CPT | Performed by: STUDENT IN AN ORGANIZED HEALTH CARE EDUCATION/TRAINING PROGRAM

## 2025-01-24 PROCEDURE — 3078F DIAST BP <80 MM HG: CPT | Performed by: STUDENT IN AN ORGANIZED HEALTH CARE EDUCATION/TRAINING PROGRAM

## 2025-01-24 PROCEDURE — 99205 OFFICE O/P NEW HI 60 MIN: CPT | Performed by: STUDENT IN AN ORGANIZED HEALTH CARE EDUCATION/TRAINING PROGRAM

## 2025-01-24 PROCEDURE — 1160F RVW MEDS BY RX/DR IN RCRD: CPT | Performed by: STUDENT IN AN ORGANIZED HEALTH CARE EDUCATION/TRAINING PROGRAM

## 2025-01-24 PROCEDURE — 99215 OFFICE O/P EST HI 40 MIN: CPT | Performed by: STUDENT IN AN ORGANIZED HEALTH CARE EDUCATION/TRAINING PROGRAM

## 2025-01-24 PROCEDURE — G2211 COMPLEX E/M VISIT ADD ON: HCPCS | Performed by: STUDENT IN AN ORGANIZED HEALTH CARE EDUCATION/TRAINING PROGRAM

## 2025-01-24 ASSESSMENT — PATIENT HEALTH QUESTIONNAIRE - PHQ9
SUM OF ALL RESPONSES TO PHQ9 QUESTIONS 1 AND 2: 0
2. FEELING DOWN, DEPRESSED OR HOPELESS: NOT AT ALL
1. LITTLE INTEREST OR PLEASURE IN DOING THINGS: NOT AT ALL

## 2025-01-24 ASSESSMENT — ENCOUNTER SYMPTOMS
DEPRESSION: 0
LOSS OF SENSATION IN FEET: 0
OCCASIONAL FEELINGS OF UNSTEADINESS: 0

## 2025-01-27 ENCOUNTER — TELEPHONE (OUTPATIENT)
Dept: CARDIOLOGY | Facility: CLINIC | Age: 85
End: 2025-01-27
Payer: MEDICARE

## 2025-01-27 NOTE — TELEPHONE ENCOUNTER
Patient needs to have surgery ,not date, but POC is requested. See note in system from Dr. Luna. Visit is pending 1/31/2025 with Dr. Farooq    [phone] surgeon phone.

## 2025-01-29 ENCOUNTER — APPOINTMENT (OUTPATIENT)
Dept: HEMATOLOGY/ONCOLOGY | Facility: HOSPITAL | Age: 85
End: 2025-01-29
Payer: MEDICARE

## 2025-01-30 ENCOUNTER — TELEPHONE (OUTPATIENT)
Dept: CARDIOTHORACIC SURGERY | Facility: HOSPITAL | Age: 85
End: 2025-01-30
Payer: MEDICARE

## 2025-01-30 DIAGNOSIS — C16.0 ADENOCARCINOMA OF GASTROESOPHAGEAL JUNCTION (MULTI): Primary | ICD-10-CM

## 2025-01-31 ENCOUNTER — OFFICE VISIT (OUTPATIENT)
Dept: CARDIOLOGY | Facility: CLINIC | Age: 85
End: 2025-01-31
Payer: MEDICARE

## 2025-01-31 VITALS
BODY MASS INDEX: 18 KG/M2 | HEIGHT: 66 IN | SYSTOLIC BLOOD PRESSURE: 96 MMHG | WEIGHT: 112 LBS | DIASTOLIC BLOOD PRESSURE: 60 MMHG | HEART RATE: 91 BPM

## 2025-01-31 DIAGNOSIS — C16.0: ICD-10-CM

## 2025-01-31 DIAGNOSIS — E78.2 MIXED HYPERLIPIDEMIA: ICD-10-CM

## 2025-01-31 DIAGNOSIS — Z01.810 PRE-OPERATIVE CARDIOVASCULAR EXAMINATION: ICD-10-CM

## 2025-01-31 DIAGNOSIS — R63.4 WEIGHT LOSS, ABNORMAL: ICD-10-CM

## 2025-01-31 DIAGNOSIS — I10 ESSENTIAL HYPERTENSION: ICD-10-CM

## 2025-01-31 DIAGNOSIS — I44.0 FIRST DEGREE AV BLOCK: Primary | ICD-10-CM

## 2025-01-31 DIAGNOSIS — I25.10 ATHEROSCLEROSIS OF NATIVE CORONARY ARTERY OF NATIVE HEART WITHOUT ANGINA PECTORIS: ICD-10-CM

## 2025-01-31 DIAGNOSIS — Z87.891 FORMER SMOKER: ICD-10-CM

## 2025-01-31 PROCEDURE — 99214 OFFICE O/P EST MOD 30 MIN: CPT | Performed by: INTERNAL MEDICINE

## 2025-01-31 PROCEDURE — 3074F SYST BP LT 130 MM HG: CPT | Performed by: INTERNAL MEDICINE

## 2025-01-31 PROCEDURE — 1160F RVW MEDS BY RX/DR IN RCRD: CPT | Performed by: INTERNAL MEDICINE

## 2025-01-31 PROCEDURE — 1159F MED LIST DOCD IN RCRD: CPT | Performed by: INTERNAL MEDICINE

## 2025-01-31 PROCEDURE — 3078F DIAST BP <80 MM HG: CPT | Performed by: INTERNAL MEDICINE

## 2025-01-31 PROCEDURE — 93000 ELECTROCARDIOGRAM COMPLETE: CPT | Performed by: INTERNAL MEDICINE

## 2025-01-31 PROCEDURE — 1036F TOBACCO NON-USER: CPT | Performed by: INTERNAL MEDICINE

## 2025-01-31 PROCEDURE — G2211 COMPLEX E/M VISIT ADD ON: HCPCS | Performed by: INTERNAL MEDICINE

## 2025-01-31 RX ORDER — ATORVASTATIN CALCIUM 10 MG/1
10 TABLET, FILM COATED ORAL NIGHTLY
Qty: 90 TABLET | Refills: 1 | Status: SHIPPED | OUTPATIENT
Start: 2025-01-31 | End: 2026-01-31

## 2025-01-31 RX ORDER — CARVEDILOL 6.25 MG/1
6.25 TABLET ORAL 2 TIMES DAILY
Qty: 180 TABLET | Refills: 1 | Status: SHIPPED | OUTPATIENT
Start: 2025-01-31 | End: 2026-01-31

## 2025-01-31 RX ORDER — OXYCODONE HYDROCHLORIDE 5 MG/1
5 CAPSULE ORAL EVERY 4 HOURS PRN
COMMUNITY

## 2025-01-31 RX ORDER — PANTOPRAZOLE SODIUM 40 MG/1
40 TABLET, DELAYED RELEASE ORAL
COMMUNITY

## 2025-01-31 RX ORDER — ONDANSETRON 4 MG/1
4 TABLET, FILM COATED ORAL EVERY 8 HOURS PRN
COMMUNITY

## 2025-01-31 NOTE — LETTER
January 31, 2025     Joseph Luna MD  [id number]  [facility] [id number]    Patient: Wilson Suresh   YOB: 1940   Date of Visit: 1/31/2025       Dear Dr. Joseph Luna MD:    Thank you for referring Wilson Suresh to me for evaluation. Below are my notes for this consultation.  If you have questions, please do not hesitate to call me. I look forward to following your patient along with you.       Sincerely,     Kathy Farooq MD      CC: No Recipients  ______________________________________________________________________________________    Accompanied by daughter and wife to the office.  Daughter identifies herself as Aylin, and is a  by profession.  Here for preoperative cardiac risk assessment prior to PEG tube placement, scheduled for early next week.    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  Minus chemotherapy and radiation, 2 weeks of surgery.  This is what I gather from patient's daughter.  Has been losing weight.  Fatigue.  PEG tube is essential to maintain nutrition.  No anginal type symptoms  Denies shortness of breath  Does around 4 METS of activity daily  EKG was reviewed      12 point ROS is negative or non contributory except as noted.   History so Far :     • Atherosclerotic heart disease of native coronary artery without angina pectoris (414.01)  (I25.10)   • 2002 Cardiac cath   mLAD 25%   CX 20%   RCA normal   Daily activity 4 METs without concerning symptoms   • Normal cardiac ejection fraction   • Nov 2021 Echo   LVEF 55-60%   MR mild   Pulm Htn moderate   • Essential hypertension (401.9) (I10)   • Low BP in office   dizziness   will reduce coreg   • Mixed hyperlipidemia (272.2) (E78.2)   • Moderate intensity statin   Will be getting annual wellness labs   • Body mass index (BMI) of 19.9 or less in adult (Z68.1)    Echocardiogram 1/17/2025-LVEF 50% grade 1 diastolic dysfunction trace tricuspid regurgitation RVSP 44 mmHg normal  "chamber dimensions trace aortic and trace mitral regurgitation left atrial diameter 3.6 cm IVC normal     PSHx: hiatal hernia repair with absorbable mesh in 2016, PCI, open appendectomy, EGD     GE junction adenocarcinoma  Objective   Wt Readings from Last 3 Encounters:   01/31/25 50.8 kg (112 lb)   01/24/25 50.8 kg (112 lb)   04/29/24 53.5 kg (118 lb)            Vitals:    01/31/25 0910   BP: 96/60   BP Location: Left arm   Patient Position: Sitting   Pulse: 91   Weight: 50.8 kg (112 lb)   Height: 1.676 m (5' 6\")                Physical Exam:  Cachectic and ill-appearing  GENERAL APPEARANCE: in no acute distress.  CHEST: Symmetric and non-tender.  INTEGUMENT: Skin warm and dry  HEENT: No gross abnormalities identified.No pallor or scleral icterus.  NECK: Supple, no JVD, no bruit.   NEURO/PSHCY: Alert and oriented x3; appropriate behavior and responses and responses  LUNGS: Clear to auscultation bilaterally; normal respiratory effort.  HEART: Rate and rhythm regular with no evident murmur; no gallop appreciated.   ABDOMEN: Soft, non tender.  MUSCULOSKELETAL: No gross deformities.  EXTREMITIES: Warm  There is no edema noted.    Meds:  Current Outpatient Medications   Medication Instructions   • aspirin-calcium carbonate 81 mg-300 mg calcium(777 mg) tablet oral, As needed   • atorvastatin (LIPITOR) 10 mg, oral, Nightly   • carvedilol (COREG) 6.25 mg, oral, 2 times daily   • mometasone-formoterol (Dulera) 200-5 mcg/actuation inhaler 1 puff, 2 times daily RT   • ondansetron (ZOFRAN) 4 mg, Every 8 hours PRN   • oxyCODONE (OXY-IR) 5 mg, Every 4 hours PRN   • pantoprazole (PROTONIX) 40 mg, Daily before breakfast   • tiotropium (Spiriva with HandiHaler) 18 mcg inhalation capsule Daily RT          Allergies   Allergen Reactions   • Hydrocodone Syncope   • Hydrocodone-Acetaminophen Other     Other Reaction(s): Passed out - Not a true allergy - CAN HAVE TYLENOL             LABS:    November 2024-sodium 140 potassium 4.1 " creatinine 1 GFR greater than 60 albumin 3.1 liver enzymes normal total cholesterol 101 HDL 59 triglycerides 117 LDL 19 cholesterol to HDL 1.7             Hemoglobin A1c 6.1 average glucose 128 hemoglobin 11.6 hematocrit 35 platelets 212,000 chest x-ray emphysema      Patient Active Problem List    Diagnosis Date Noted   • Pre-operative cardiovascular examination 01/31/2025   • Weight loss, abnormal 01/31/2025   • First degree AV block 01/31/2025   • Carcinoma of cardio-esophageal junction (Multi) 01/24/2025   • BMI less than 19,adult 04/29/2024   • Former smoker 04/29/2024   • Acute heart failure with normal ejection fraction 02/01/2024   • Atherosclerotic heart disease of native coronary artery without angina pectoris 02/01/2024   • Cancer (Multi) 02/01/2024   • Dysphagia 02/01/2024   • Essential hypertension 02/01/2024   • History of MI (myocardial infarction) 02/01/2024   • Hiatal hernia 02/01/2024   • Intermittent chest pain 02/01/2024   • Ischemic cardiomyopathy 02/01/2024   • Mixed hyperlipidemia 02/01/2024   • Premature ventricular contractions 02/01/2024                 Assessment:    1. First degree AV block        2. Pre-operative cardiovascular examination  ECG 12 Lead      3. Atherosclerosis of native coronary artery of native heart without angina pectoris  Follow Up In Cardiology    atorvastatin (Lipitor) 10 mg tablet    ECG 12 Lead      4. Essential hypertension  carvedilol (Coreg) 6.25 mg tablet      5. Mixed hyperlipidemia  atorvastatin (Lipitor) 10 mg tablet      6. Former smoker        7. BMI less than 19,adult        8. Carcinoma of cardio-esophageal junction (Multi)        9. Weight loss, abnormal        Clinical decision making:  Patient has adenocarcinoma of the GE junction.  He has dysphagia, inability to swallow, and is losing weight.  He has first-degree AV block abnormal R wave progression, right axis deviation, no diagnostic ST-T abnormality compared to an EKG from 2016.  He does not have  unstable angina decompensated heart failure or clinical valvular heart disease.  Activity level is close to 4 METS.  In view of all of these issues, risk-benefit favors proceeding with PEG tube placement without further cardiac workup.  Patient has had remote PCI and stenting, they understand that additional cardiac workup would delay surgery, and a combined decision was made to proceed with surgery given the nature of the surgery, and the benefits of improving nutritional status.  Follow up : 6 months        Provider Attestation - Scribe documentation    All medical record entries made by the Scribe were at my direction and personally dictated by me. I have reviewed the chart and agree that the record accurately reflects my personal performance of the history, physical exam, discussion and plan.     Scribe Attestation  By signing my name below, I, Hope GUARDADO LPN  , Margie   attest that this documentation has been prepared under the direction and in the presence of Kathy Farooq MD.

## 2025-01-31 NOTE — TELEPHONE ENCOUNTER
I called patient today to discuss about the workup and the tumor board recommendation.  I presented him on GI tumor board yesterday.  We reviewed imaging of PET/CT, CT scan and EGD results which showed this tumor is most likely a gastric cancer or Siewert 3 GE junction adenocarcinoma.  There was concern for mediastinal lymphadenopathy with PET activity which indicating likely metastatic disease.  Also given his significant comorbidities and prior hiatal hernia repair was mesh, he is extremely high risk for total gastrectomy and EJ reconstruction.  The recommendation is to treat this tumor as metastatic gastric cancer with definitive chemotherapy.    Given patient's a dysphagia and weight lost, I recommend feeding tube placement for nutritional support.  I will arrange for this at [place] on Monday for EGD PEG with possible open jejunostomy.    Patient understood the plan and agreed to proceed.    Joseph Luna MD  Thoracic Surgeon  [facility]   of Medicine  [facility]  Office phone: [phone]  Fax: [phone]  Pager: [phone]

## 2025-01-31 NOTE — PREPROCEDURE INSTRUCTIONS

## 2025-01-31 NOTE — PROGRESS NOTES
"Accompanied by daughter and wife to the office.  Daughter identifies herself as Aylin, and is a  by profession.  Here for preoperative cardiac risk assessment prior to PEG tube placement, scheduled for early next week.    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  Minus chemotherapy and radiation, 2 weeks of surgery.  This is what I gather from patient's daughter.  Has been losing weight.  Fatigue.  PEG tube is essential to maintain nutrition.  No anginal type symptoms  Denies shortness of breath  Does around 4 METS of activity daily  EKG was reviewed      12 point ROS is negative or non contributory except as noted.   History so Far :      Atherosclerotic heart disease of native coronary artery without angina pectoris (414.01)  (I25.10)    2002 Cardiac cath   mLAD 25%   CX 20%   RCA normal   Daily activity 4 METs without concerning symptoms    Normal cardiac ejection fraction    Nov 2021 Echo   LVEF 55-60%   MR mild   Pulm Htn moderate    Essential hypertension (401.9) (I10)    Low BP in office   dizziness   will reduce coreg    Mixed hyperlipidemia (272.2) (E78.2)    Moderate intensity statin   Will be getting annual wellness labs    Body mass index (BMI) of 19.9 or less in adult (Z68.1)    Echocardiogram 1/17/2025-LVEF 50% grade 1 diastolic dysfunction trace tricuspid regurgitation RVSP 44 mmHg normal chamber dimensions trace aortic and trace mitral regurgitation left atrial diameter 3.6 cm IVC normal     PSHx: hiatal hernia repair with absorbable mesh in 2016, PCI, open appendectomy, EGD     GE junction adenocarcinoma  Objective   Wt Readings from Last 3 Encounters:   01/31/25 50.8 kg (112 lb)   01/24/25 50.8 kg (112 lb)   04/29/24 53.5 kg (118 lb)            Vitals:    01/31/25 0910   BP: 96/60   BP Location: Left arm   Patient Position: Sitting   Pulse: 91   Weight: 50.8 kg (112 lb)   Height: 1.676 m (5' 6\")                Physical Exam:  Cachectic and " ill-appearing  GENERAL APPEARANCE: in no acute distress.  CHEST: Symmetric and non-tender.  INTEGUMENT: Skin warm and dry  HEENT: No gross abnormalities identified.No pallor or scleral icterus.  NECK: Supple, no JVD, no bruit.   NEURO/PSHCY: Alert and oriented x3; appropriate behavior and responses and responses  LUNGS: Clear to auscultation bilaterally; normal respiratory effort.  HEART: Rate and rhythm regular with no evident murmur; no gallop appreciated.   ABDOMEN: Soft, non tender.  MUSCULOSKELETAL: No gross deformities.  EXTREMITIES: Warm  There is no edema noted.    Meds:  Current Outpatient Medications   Medication Instructions    aspirin-calcium carbonate 81 mg-300 mg calcium(777 mg) tablet oral, As needed    atorvastatin (LIPITOR) 10 mg, oral, Nightly    carvedilol (COREG) 6.25 mg, oral, 2 times daily    mometasone-formoterol (Dulera) 200-5 mcg/actuation inhaler 1 puff, 2 times daily RT    ondansetron (ZOFRAN) 4 mg, Every 8 hours PRN    oxyCODONE (OXY-IR) 5 mg, Every 4 hours PRN    pantoprazole (PROTONIX) 40 mg, Daily before breakfast    tiotropium (Spiriva with HandiHaler) 18 mcg inhalation capsule Daily RT          Allergies   Allergen Reactions    Hydrocodone Syncope    Hydrocodone-Acetaminophen Other     Other Reaction(s): Passed out - Not a true allergy - CAN HAVE TYLENOL             LABS:    November 2024-sodium 140 potassium 4.1 creatinine 1 GFR greater than 60 albumin 3.1 liver enzymes normal total cholesterol 101 HDL 59 triglycerides 117 LDL 19 cholesterol to HDL 1.7             Hemoglobin A1c 6.1 average glucose 128 hemoglobin 11.6 hematocrit 35 platelets 212,000 chest x-ray emphysema      Patient Active Problem List    Diagnosis Date Noted    Pre-operative cardiovascular examination 01/31/2025    Weight loss, abnormal 01/31/2025    First degree AV block 01/31/2025    Carcinoma of cardio-esophageal junction (Multi) 01/24/2025    BMI less than 19,adult 04/29/2024    Former smoker 04/29/2024     Acute heart failure with normal ejection fraction 02/01/2024    Atherosclerotic heart disease of native coronary artery without angina pectoris 02/01/2024    Cancer (Multi) 02/01/2024    Dysphagia 02/01/2024    Essential hypertension 02/01/2024    History of MI (myocardial infarction) 02/01/2024    Hiatal hernia 02/01/2024    Intermittent chest pain 02/01/2024    Ischemic cardiomyopathy 02/01/2024    Mixed hyperlipidemia 02/01/2024    Premature ventricular contractions 02/01/2024                 Assessment:    1. First degree AV block        2. Pre-operative cardiovascular examination  ECG 12 Lead      3. Atherosclerosis of native coronary artery of native heart without angina pectoris  Follow Up In Cardiology    atorvastatin (Lipitor) 10 mg tablet    ECG 12 Lead      4. Essential hypertension  carvedilol (Coreg) 6.25 mg tablet      5. Mixed hyperlipidemia  atorvastatin (Lipitor) 10 mg tablet      6. Former smoker        7. BMI less than 19,adult        8. Carcinoma of cardio-esophageal junction (Multi)        9. Weight loss, abnormal        Clinical decision making:  Patient has adenocarcinoma of the GE junction.  He has dysphagia, inability to swallow, and is losing weight.  He has first-degree AV block abnormal R wave progression, right axis deviation, no diagnostic ST-T abnormality compared to an EKG from 2016.  He does not have unstable angina decompensated heart failure or clinical valvular heart disease.  Activity level is close to 4 METS.  In view of all of these issues, risk-benefit favors proceeding with PEG tube placement without further cardiac workup.  Patient has had remote PCI and stenting, they understand that additional cardiac workup would delay surgery, and a combined decision was made to proceed with surgery given the nature of the surgery, and the benefits of improving nutritional status.  Follow up : 6 months        Provider Attestation - Scribe documentation    All medical record entries made  by the Scribe were at my direction and personally dictated by me. I have reviewed the chart and agree that the record accurately reflects my personal performance of the history, physical exam, discussion and plan.     Scribe Attestation  By signing my name below, I, Hope GUARDADO LPN  , Scribe   attest that this documentation has been prepared under the direction and in the presence of Kathy Farooq MD.

## 2025-01-31 NOTE — LETTER
January 31, 2025     Saumya Griffith, APRN-CNP  [address] [facility] [address]    Patient: Wilson Suresh   YOB: 1940   Date of Visit: 1/31/2025       Dear Dr. Saumya Griffith, APRN-CNP:    Thank you for referring Wilson Suresh to me for evaluation. Below are my notes for this consultation.  If you have questions, please do not hesitate to call me. I look forward to following your patient along with you.       Sincerely,     Kathy Farooq MD      CC: No Recipients  ______________________________________________________________________________________    Accompanied by daughter and wife to the office.  Daughter identifies herself as Aylin, and is a  by profession.  Here for preoperative cardiac risk assessment prior to PEG tube placement, scheduled for early next week.    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  Minus chemotherapy and radiation, 2 weeks of surgery.  This is what I gather from patient's daughter.  Has been losing weight.  Fatigue.  PEG tube is essential to maintain nutrition.  No anginal type symptoms  Denies shortness of breath  Does around 4 METS of activity daily  EKG was reviewed      12 point ROS is negative or non contributory except as noted.   History so Far :     • Atherosclerotic heart disease of native coronary artery without angina pectoris (414.01)  (I25.10)   • 2002 Cardiac cath   mLAD 25%   CX 20%   RCA normal   Daily activity 4 METs without concerning symptoms   • Normal cardiac ejection fraction   • Nov 2021 Echo   LVEF 55-60%   MR mild   Pulm Htn moderate   • Essential hypertension (401.9) (I10)   • Low BP in office   dizziness   will reduce coreg   • Mixed hyperlipidemia (272.2) (E78.2)   • Moderate intensity statin   Will be getting annual wellness labs   • Body mass index (BMI) of 19.9 or less in adult (Z68.1)    Echocardiogram 1/17/2025-LVEF 50% grade 1 diastolic dysfunction trace tricuspid regurgitation RVSP 44 mmHg  "normal chamber dimensions trace aortic and trace mitral regurgitation left atrial diameter 3.6 cm IVC normal     PSHx: hiatal hernia repair with absorbable mesh in 2016, PCI, open appendectomy, EGD     GE junction adenocarcinoma  Objective   Wt Readings from Last 3 Encounters:   01/31/25 50.8 kg (112 lb)   01/24/25 50.8 kg (112 lb)   04/29/24 53.5 kg (118 lb)            Vitals:    01/31/25 0910   BP: 96/60   BP Location: Left arm   Patient Position: Sitting   Pulse: 91   Weight: 50.8 kg (112 lb)   Height: 1.676 m (5' 6\")                Physical Exam:  Cachectic and ill-appearing  GENERAL APPEARANCE: in no acute distress.  CHEST: Symmetric and non-tender.  INTEGUMENT: Skin warm and dry  HEENT: No gross abnormalities identified.No pallor or scleral icterus.  NECK: Supple, no JVD, no bruit.   NEURO/PSHCY: Alert and oriented x3; appropriate behavior and responses and responses  LUNGS: Clear to auscultation bilaterally; normal respiratory effort.  HEART: Rate and rhythm regular with no evident murmur; no gallop appreciated.   ABDOMEN: Soft, non tender.  MUSCULOSKELETAL: No gross deformities.  EXTREMITIES: Warm  There is no edema noted.    Meds:  Current Outpatient Medications   Medication Instructions   • aspirin-calcium carbonate 81 mg-300 mg calcium(777 mg) tablet oral, As needed   • atorvastatin (LIPITOR) 10 mg, oral, Nightly   • carvedilol (COREG) 6.25 mg, oral, 2 times daily   • mometasone-formoterol (Dulera) 200-5 mcg/actuation inhaler 1 puff, 2 times daily RT   • ondansetron (ZOFRAN) 4 mg, Every 8 hours PRN   • oxyCODONE (OXY-IR) 5 mg, Every 4 hours PRN   • pantoprazole (PROTONIX) 40 mg, Daily before breakfast   • tiotropium (Spiriva with HandiHaler) 18 mcg inhalation capsule Daily RT          Allergies   Allergen Reactions   • Hydrocodone Syncope   • Hydrocodone-Acetaminophen Other     Other Reaction(s): Passed out - Not a true allergy - CAN HAVE TYLENOL             LABS:    November 2024-sodium 140 potassium 4.1 " creatinine 1 GFR greater than 60 albumin 3.1 liver enzymes normal total cholesterol 101 HDL 59 triglycerides 117 LDL 19 cholesterol to HDL 1.7             Hemoglobin A1c 6.1 average glucose 128 hemoglobin 11.6 hematocrit 35 platelets 212,000 chest x-ray emphysema      Patient Active Problem List    Diagnosis Date Noted   • Pre-operative cardiovascular examination 01/31/2025   • Weight loss, abnormal 01/31/2025   • First degree AV block 01/31/2025   • Carcinoma of cardio-esophageal junction (Multi) 01/24/2025   • BMI less than 19,adult 04/29/2024   • Former smoker 04/29/2024   • Acute heart failure with normal ejection fraction 02/01/2024   • Atherosclerotic heart disease of native coronary artery without angina pectoris 02/01/2024   • Cancer (Multi) 02/01/2024   • Dysphagia 02/01/2024   • Essential hypertension 02/01/2024   • History of MI (myocardial infarction) 02/01/2024   • Hiatal hernia 02/01/2024   • Intermittent chest pain 02/01/2024   • Ischemic cardiomyopathy 02/01/2024   • Mixed hyperlipidemia 02/01/2024   • Premature ventricular contractions 02/01/2024                 Assessment:    1. First degree AV block        2. Pre-operative cardiovascular examination  ECG 12 Lead      3. Atherosclerosis of native coronary artery of native heart without angina pectoris  Follow Up In Cardiology    atorvastatin (Lipitor) 10 mg tablet    ECG 12 Lead      4. Essential hypertension  carvedilol (Coreg) 6.25 mg tablet      5. Mixed hyperlipidemia  atorvastatin (Lipitor) 10 mg tablet      6. Former smoker        7. BMI less than 19,adult        8. Carcinoma of cardio-esophageal junction (Multi)        9. Weight loss, abnormal        Clinical decision making:  Patient has adenocarcinoma of the GE junction.  He has dysphagia, inability to swallow, and is losing weight.  He has first-degree AV block abnormal R wave progression, right axis deviation, no diagnostic ST-T abnormality compared to an EKG from 2016.  He does not have  unstable angina decompensated heart failure or clinical valvular heart disease.  Activity level is close to 4 METS.  In view of all of these issues, risk-benefit favors proceeding with PEG tube placement without further cardiac workup.  Patient has had remote PCI and stenting, they understand that additional cardiac workup would delay surgery, and a combined decision was made to proceed with surgery given the nature of the surgery, and the benefits of improving nutritional status.  Follow up : 6 months        Provider Attestation - Scribe documentation    All medical record entries made by the Scribe were at my direction and personally dictated by me. I have reviewed the chart and agree that the record accurately reflects my personal performance of the history, physical exam, discussion and plan.     Scribe Attestation  By signing my name below, I, Hope GUARDADO LPN  , Margie   attest that this documentation has been prepared under the direction and in the presence of Kathy Farooq MD.

## 2025-01-31 NOTE — PATIENT INSTRUCTIONS
Please bring all medicines, vitamins, and herbal supplements with you when you come to the office.    Prescriptions will not be filled unless you are compliant with your follow up appointments or have a follow up appointment scheduled as per instruction of your physician. Refills should be requested at the time of your visit. Fall Prevention Education Given   BMI was below normal measurement. Current weight: 50.8 kg (112 lb)  Weight change since last visit (-) denotes wt loss 0 lbs   Weight gain needed to achieve  BMI of 18.5 = 2.3 Lbs    Provided dietary education for weight gain to the patient  Instructed patient to consume high calorie, high protein diet.  Wilson Suresh is clear for surgery from a cardiac standpoint

## 2025-02-02 ENCOUNTER — ANESTHESIA EVENT (OUTPATIENT)
Dept: OPERATING ROOM | Facility: HOSPITAL | Age: 85
End: 2025-02-02
Payer: MEDICARE

## 2025-02-02 PROBLEM — J44.9 CHRONIC OBSTRUCTIVE PULMONARY DISEASE (MULTI): Status: ACTIVE | Noted: 2025-02-02

## 2025-02-02 SDOH — HEALTH STABILITY: MENTAL HEALTH: CURRENT SMOKER: 0

## 2025-02-03 ENCOUNTER — APPOINTMENT (OUTPATIENT)
Dept: CARDIOLOGY | Facility: HOSPITAL | Age: 85
End: 2025-02-03
Payer: MEDICARE

## 2025-02-03 ENCOUNTER — ANESTHESIA (OUTPATIENT)
Dept: OPERATING ROOM | Facility: HOSPITAL | Age: 85
End: 2025-02-03
Payer: MEDICARE

## 2025-02-03 ENCOUNTER — HOSPITAL ENCOUNTER (INPATIENT)
Facility: HOSPITAL | Age: 85
LOS: 1 days | Discharge: HOME HEALTH | End: 2025-02-05
Attending: STUDENT IN AN ORGANIZED HEALTH CARE EDUCATION/TRAINING PROGRAM | Admitting: STUDENT IN AN ORGANIZED HEALTH CARE EDUCATION/TRAINING PROGRAM
Payer: MEDICARE

## 2025-02-03 DIAGNOSIS — G89.3 CHRONIC PAIN DUE TO NEOPLASM: ICD-10-CM

## 2025-02-03 DIAGNOSIS — G89.18 ACUTE POST-OPERATIVE PAIN: ICD-10-CM

## 2025-02-03 DIAGNOSIS — C16.0 ADENOCARCINOMA OF GASTROESOPHAGEAL JUNCTION (MULTI): Primary | ICD-10-CM

## 2025-02-03 DIAGNOSIS — R13.19 ESOPHAGEAL DYSPHAGIA: ICD-10-CM

## 2025-02-03 LAB
ANION GAP SERPL CALC-SCNC: 12 MMOL/L (ref 10–20)
ANION GAP SERPL CALC-SCNC: 14 MMOL/L (ref 10–20)
BUN SERPL-MCNC: 22 MG/DL (ref 6–23)
BUN SERPL-MCNC: 24 MG/DL (ref 6–23)
CALCIUM SERPL-MCNC: 9.1 MG/DL (ref 8.6–10.3)
CALCIUM SERPL-MCNC: 9.1 MG/DL (ref 8.6–10.3)
CHLORIDE SERPL-SCNC: 105 MMOL/L (ref 98–107)
CHLORIDE SERPL-SCNC: 106 MMOL/L (ref 98–107)
CO2 SERPL-SCNC: 23 MMOL/L (ref 21–32)
CO2 SERPL-SCNC: 24 MMOL/L (ref 21–32)
CREAT SERPL-MCNC: 1.05 MG/DL (ref 0.5–1.3)
CREAT SERPL-MCNC: 1.15 MG/DL (ref 0.5–1.3)
EGFRCR SERPLBLD CKD-EPI 2021: 63 ML/MIN/1.73M*2
EGFRCR SERPLBLD CKD-EPI 2021: 70 ML/MIN/1.73M*2
ERYTHROCYTE [DISTWIDTH] IN BLOOD BY AUTOMATED COUNT: 15.2 % (ref 11.5–14.5)
GLUCOSE SERPL-MCNC: 118 MG/DL (ref 74–99)
GLUCOSE SERPL-MCNC: 126 MG/DL (ref 74–99)
HCT VFR BLD AUTO: 32.5 % (ref 41–52)
HGB BLD-MCNC: 10.6 G/DL (ref 13.5–17.5)
HOLD SPECIMEN: NORMAL
MAGNESIUM SERPL-MCNC: 1.98 MG/DL (ref 1.6–2.4)
MAGNESIUM SERPL-MCNC: 2.16 MG/DL (ref 1.6–2.4)
MCH RBC QN AUTO: 30.2 PG (ref 26–34)
MCHC RBC AUTO-ENTMCNC: 32.6 G/DL (ref 32–36)
MCV RBC AUTO: 93 FL (ref 80–100)
NRBC BLD-RTO: 0 /100 WBCS (ref 0–0)
PHOSPHATE SERPL-MCNC: 3.3 MG/DL (ref 2.5–4.9)
PHOSPHATE SERPL-MCNC: 3.5 MG/DL (ref 2.5–4.9)
PLATELET # BLD AUTO: 216 X10*3/UL (ref 150–450)
POTASSIUM SERPL-SCNC: 4.5 MMOL/L (ref 3.5–5.3)
POTASSIUM SERPL-SCNC: 4.6 MMOL/L (ref 3.5–5.3)
POTASSIUM SERPL-SCNC: 4.7 MMOL/L (ref 3.5–5.3)
RBC # BLD AUTO: 3.51 X10*6/UL (ref 4.5–5.9)
SODIUM SERPL-SCNC: 137 MMOL/L (ref 136–145)
SODIUM SERPL-SCNC: 137 MMOL/L (ref 136–145)
WBC # BLD AUTO: 6.6 X10*3/UL (ref 4.4–11.3)

## 2025-02-03 PROCEDURE — 3700000001 HC GENERAL ANESTHESIA TIME - INITIAL BASE CHARGE: Performed by: STUDENT IN AN ORGANIZED HEALTH CARE EDUCATION/TRAINING PROGRAM

## 2025-02-03 PROCEDURE — 2780000003 HC OR 278 NO HCPCS: Performed by: STUDENT IN AN ORGANIZED HEALTH CARE EDUCATION/TRAINING PROGRAM

## 2025-02-03 PROCEDURE — 43246 EGD PLACE GASTROSTOMY TUBE: CPT | Performed by: STUDENT IN AN ORGANIZED HEALTH CARE EDUCATION/TRAINING PROGRAM

## 2025-02-03 PROCEDURE — 84132 ASSAY OF SERUM POTASSIUM: CPT | Performed by: NURSE PRACTITIONER

## 2025-02-03 PROCEDURE — 2500000004 HC RX 250 GENERAL PHARMACY W/ HCPCS (ALT 636 FOR OP/ED): Performed by: NURSE PRACTITIONER

## 2025-02-03 PROCEDURE — 2500000004 HC RX 250 GENERAL PHARMACY W/ HCPCS (ALT 636 FOR OP/ED): Performed by: STUDENT IN AN ORGANIZED HEALTH CARE EDUCATION/TRAINING PROGRAM

## 2025-02-03 PROCEDURE — 2500000004 HC RX 250 GENERAL PHARMACY W/ HCPCS (ALT 636 FOR OP/ED): Performed by: NURSE ANESTHETIST, CERTIFIED REGISTERED

## 2025-02-03 PROCEDURE — 85027 COMPLETE CBC AUTOMATED: CPT | Performed by: NURSE PRACTITIONER

## 2025-02-03 PROCEDURE — 96372 THER/PROPH/DIAG INJ SC/IM: CPT | Performed by: NURSE PRACTITIONER

## 2025-02-03 PROCEDURE — 36415 COLL VENOUS BLD VENIPUNCTURE: CPT | Performed by: NURSE PRACTITIONER

## 2025-02-03 PROCEDURE — 3600000004 HC OR TIME - INITIAL BASE CHARGE - PROCEDURE LEVEL FOUR: Performed by: STUDENT IN AN ORGANIZED HEALTH CARE EDUCATION/TRAINING PROGRAM

## 2025-02-03 PROCEDURE — 93010 ELECTROCARDIOGRAM REPORT: CPT | Performed by: INTERNAL MEDICINE

## 2025-02-03 PROCEDURE — 80048 BASIC METABOLIC PNL TOTAL CA: CPT | Performed by: NURSE PRACTITIONER

## 2025-02-03 PROCEDURE — 7100000011 HC EXTENDED STAY RECOVERY HOURLY - NURSING UNIT

## 2025-02-03 PROCEDURE — 7100000001 HC RECOVERY ROOM TIME - INITIAL BASE CHARGE: Performed by: STUDENT IN AN ORGANIZED HEALTH CARE EDUCATION/TRAINING PROGRAM

## 2025-02-03 PROCEDURE — 3700000002 HC GENERAL ANESTHESIA TIME - EACH INCREMENTAL 1 MINUTE: Performed by: STUDENT IN AN ORGANIZED HEALTH CARE EDUCATION/TRAINING PROGRAM

## 2025-02-03 PROCEDURE — 2060000001 HC INTERMEDIATE ICU ROOM DAILY

## 2025-02-03 PROCEDURE — 84100 ASSAY OF PHOSPHORUS: CPT | Performed by: NURSE PRACTITIONER

## 2025-02-03 PROCEDURE — 2500000001 HC RX 250 WO HCPCS SELF ADMINISTERED DRUGS (ALT 637 FOR MEDICARE OP): Performed by: NURSE PRACTITIONER

## 2025-02-03 PROCEDURE — 83735 ASSAY OF MAGNESIUM: CPT | Performed by: NURSE PRACTITIONER

## 2025-02-03 PROCEDURE — 7100000002 HC RECOVERY ROOM TIME - EACH INCREMENTAL 1 MINUTE: Performed by: STUDENT IN AN ORGANIZED HEALTH CARE EDUCATION/TRAINING PROGRAM

## 2025-02-03 PROCEDURE — 2500000004 HC RX 250 GENERAL PHARMACY W/ HCPCS (ALT 636 FOR OP/ED): Mod: JZ | Performed by: STUDENT IN AN ORGANIZED HEALTH CARE EDUCATION/TRAINING PROGRAM

## 2025-02-03 PROCEDURE — 93005 ELECTROCARDIOGRAM TRACING: CPT

## 2025-02-03 PROCEDURE — 0DH63UZ INSERTION OF FEEDING DEVICE INTO STOMACH, PERCUTANEOUS APPROACH: ICD-10-PCS | Performed by: STUDENT IN AN ORGANIZED HEALTH CARE EDUCATION/TRAINING PROGRAM

## 2025-02-03 PROCEDURE — 3600000009 HC OR TIME - EACH INCREMENTAL 1 MINUTE - PROCEDURE LEVEL FOUR: Performed by: STUDENT IN AN ORGANIZED HEALTH CARE EDUCATION/TRAINING PROGRAM

## 2025-02-03 DEVICE — IMPLANTABLE DEVICE: Type: IMPLANTABLE DEVICE | Site: ABDOMEN | Status: FUNCTIONAL

## 2025-02-03 RX ORDER — LIDOCAINE HYDROCHLORIDE 20 MG/ML
INJECTION, SOLUTION INFILTRATION; PERINEURAL AS NEEDED
Status: DISCONTINUED | OUTPATIENT
Start: 2025-02-03 | End: 2025-02-03

## 2025-02-03 RX ORDER — CARVEDILOL 6.25 MG/1
6.25 TABLET ORAL 2 TIMES DAILY
Status: DISCONTINUED | OUTPATIENT
Start: 2025-02-03 | End: 2025-02-04

## 2025-02-03 RX ORDER — ALBUTEROL SULFATE 0.83 MG/ML
2.5 SOLUTION RESPIRATORY (INHALATION) EVERY 8 HOURS PRN
Status: DISCONTINUED | OUTPATIENT
Start: 2025-02-03 | End: 2025-02-05 | Stop reason: HOSPADM

## 2025-02-03 RX ORDER — OXYCODONE HCL 5 MG/5 ML
5 SOLUTION, ORAL ORAL EVERY 4 HOURS PRN
Status: DISCONTINUED | OUTPATIENT
Start: 2025-02-03 | End: 2025-02-05 | Stop reason: HOSPADM

## 2025-02-03 RX ORDER — MEPERIDINE HYDROCHLORIDE 25 MG/ML
12.5 INJECTION INTRAMUSCULAR; INTRAVENOUS; SUBCUTANEOUS EVERY 10 MIN PRN
Status: DISCONTINUED | OUTPATIENT
Start: 2025-02-03 | End: 2025-02-03 | Stop reason: HOSPADM

## 2025-02-03 RX ORDER — HYDROMORPHONE HYDROCHLORIDE 0.2 MG/ML
0.2 INJECTION INTRAMUSCULAR; INTRAVENOUS; SUBCUTANEOUS EVERY 2 HOUR PRN
Status: DISCONTINUED | OUTPATIENT
Start: 2025-02-03 | End: 2025-02-05 | Stop reason: HOSPADM

## 2025-02-03 RX ORDER — PHENYLEPHRINE HCL IN 0.9% NACL 1 MG/10 ML
SYRINGE (ML) INTRAVENOUS AS NEEDED
Status: DISCONTINUED | OUTPATIENT
Start: 2025-02-03 | End: 2025-02-03

## 2025-02-03 RX ORDER — LABETALOL HYDROCHLORIDE 5 MG/ML
5 INJECTION, SOLUTION INTRAVENOUS ONCE AS NEEDED
Status: DISCONTINUED | OUTPATIENT
Start: 2025-02-03 | End: 2025-02-03 | Stop reason: HOSPADM

## 2025-02-03 RX ORDER — ACETAMINOPHEN 325 MG/1
650 TABLET ORAL EVERY 4 HOURS PRN
Status: DISCONTINUED | OUTPATIENT
Start: 2025-02-03 | End: 2025-02-04

## 2025-02-03 RX ORDER — POLYETHYLENE GLYCOL 3350 17 G/17G
17 POWDER, FOR SOLUTION ORAL DAILY
Status: DISCONTINUED | OUTPATIENT
Start: 2025-02-03 | End: 2025-02-04

## 2025-02-03 RX ORDER — OXYCODONE HYDROCHLORIDE 5 MG/1
5 TABLET ORAL EVERY 4 HOURS PRN
Status: CANCELLED | OUTPATIENT
Start: 2025-02-03

## 2025-02-03 RX ORDER — ONDANSETRON HYDROCHLORIDE 2 MG/ML
4 INJECTION, SOLUTION INTRAVENOUS EVERY 4 HOURS PRN
Status: DISCONTINUED | OUTPATIENT
Start: 2025-02-03 | End: 2025-02-05 | Stop reason: HOSPADM

## 2025-02-03 RX ORDER — PANTOPRAZOLE SODIUM 40 MG/1
40 TABLET, DELAYED RELEASE ORAL
Status: DISCONTINUED | OUTPATIENT
Start: 2025-02-04 | End: 2025-02-04

## 2025-02-03 RX ORDER — SUCCINYLCHOLINE CHLORIDE 20 MG/ML
INJECTION INTRAMUSCULAR; INTRAVENOUS AS NEEDED
Status: DISCONTINUED | OUTPATIENT
Start: 2025-02-03 | End: 2025-02-03

## 2025-02-03 RX ORDER — OXYCODONE HYDROCHLORIDE 5 MG/1
10 TABLET ORAL EVERY 4 HOURS PRN
Status: CANCELLED | OUTPATIENT
Start: 2025-02-03

## 2025-02-03 RX ORDER — DIPHENHYDRAMINE HYDROCHLORIDE 50 MG/ML
25 INJECTION INTRAMUSCULAR; INTRAVENOUS EVERY 6 HOURS PRN
Status: DISCONTINUED | OUTPATIENT
Start: 2025-02-03 | End: 2025-02-05 | Stop reason: HOSPADM

## 2025-02-03 RX ORDER — SODIUM CHLORIDE, SODIUM LACTATE, POTASSIUM CHLORIDE, CALCIUM CHLORIDE 600; 310; 30; 20 MG/100ML; MG/100ML; MG/100ML; MG/100ML
INJECTION, SOLUTION INTRAVENOUS CONTINUOUS PRN
Status: DISCONTINUED | OUTPATIENT
Start: 2025-02-03 | End: 2025-02-03

## 2025-02-03 RX ORDER — ONDANSETRON 4 MG/1
4 TABLET, ORALLY DISINTEGRATING ORAL EVERY 4 HOURS PRN
Status: DISCONTINUED | OUTPATIENT
Start: 2025-02-03 | End: 2025-02-05 | Stop reason: HOSPADM

## 2025-02-03 RX ORDER — HEPARIN SODIUM 5000 [USP'U]/ML
5000 INJECTION, SOLUTION INTRAVENOUS; SUBCUTANEOUS EVERY 8 HOURS
Status: DISCONTINUED | OUTPATIENT
Start: 2025-02-03 | End: 2025-02-05 | Stop reason: HOSPADM

## 2025-02-03 RX ORDER — FLUTICASONE FUROATE AND VILANTEROL 200; 25 UG/1; UG/1
1 POWDER RESPIRATORY (INHALATION)
Status: DISCONTINUED | OUTPATIENT
Start: 2025-02-03 | End: 2025-02-05 | Stop reason: HOSPADM

## 2025-02-03 RX ORDER — HYDRALAZINE HYDROCHLORIDE 20 MG/ML
5 INJECTION INTRAMUSCULAR; INTRAVENOUS EVERY 30 MIN PRN
Status: DISCONTINUED | OUTPATIENT
Start: 2025-02-03 | End: 2025-02-03 | Stop reason: HOSPADM

## 2025-02-03 RX ORDER — SODIUM CHLORIDE, SODIUM LACTATE, POTASSIUM CHLORIDE, CALCIUM CHLORIDE 600; 310; 30; 20 MG/100ML; MG/100ML; MG/100ML; MG/100ML
100 INJECTION, SOLUTION INTRAVENOUS CONTINUOUS
Status: DISCONTINUED | OUTPATIENT
Start: 2025-02-03 | End: 2025-02-03 | Stop reason: HOSPADM

## 2025-02-03 RX ORDER — LANOLIN ALCOHOL/MO/W.PET/CERES
100 CREAM (GRAM) TOPICAL DAILY
Status: DISCONTINUED | OUTPATIENT
Start: 2025-02-03 | End: 2025-02-04

## 2025-02-03 RX ORDER — NALOXONE HYDROCHLORIDE 1 MG/ML
0.2 INJECTION INTRAMUSCULAR; INTRAVENOUS; SUBCUTANEOUS EVERY 5 MIN PRN
Status: DISCONTINUED | OUTPATIENT
Start: 2025-02-03 | End: 2025-02-05 | Stop reason: HOSPADM

## 2025-02-03 RX ORDER — ALBUTEROL SULFATE 0.83 MG/ML
2.5 SOLUTION RESPIRATORY (INHALATION) ONCE AS NEEDED
Status: DISCONTINUED | OUTPATIENT
Start: 2025-02-03 | End: 2025-02-03 | Stop reason: HOSPADM

## 2025-02-03 RX ORDER — OXYCODONE HYDROCHLORIDE 5 MG/1
5 TABLET ORAL EVERY 4 HOURS PRN
Status: DISCONTINUED | OUTPATIENT
Start: 2025-02-03 | End: 2025-02-04

## 2025-02-03 RX ORDER — SODIUM CHLORIDE, SODIUM LACTATE, POTASSIUM CHLORIDE, CALCIUM CHLORIDE 600; 310; 30; 20 MG/100ML; MG/100ML; MG/100ML; MG/100ML
50 INJECTION, SOLUTION INTRAVENOUS CONTINUOUS
Status: ACTIVE | OUTPATIENT
Start: 2025-02-03 | End: 2025-02-03

## 2025-02-03 RX ORDER — ONDANSETRON HYDROCHLORIDE 2 MG/ML
4 INJECTION, SOLUTION INTRAVENOUS ONCE AS NEEDED
Status: DISCONTINUED | OUTPATIENT
Start: 2025-02-03 | End: 2025-02-03 | Stop reason: HOSPADM

## 2025-02-03 RX ORDER — ATORVASTATIN CALCIUM 10 MG/1
10 TABLET, FILM COATED ORAL NIGHTLY
Status: DISCONTINUED | OUTPATIENT
Start: 2025-02-03 | End: 2025-02-04

## 2025-02-03 RX ORDER — PANTOPRAZOLE SODIUM 40 MG/10ML
40 INJECTION, POWDER, LYOPHILIZED, FOR SOLUTION INTRAVENOUS DAILY
Status: DISCONTINUED | OUTPATIENT
Start: 2025-02-03 | End: 2025-02-04

## 2025-02-03 RX ORDER — CEFAZOLIN SODIUM 2 G/100ML
2 INJECTION, SOLUTION INTRAVENOUS ONCE
Status: COMPLETED | OUTPATIENT
Start: 2025-02-03 | End: 2025-02-03

## 2025-02-03 RX ORDER — CEFAZOLIN SODIUM 2 G/100ML
2 INJECTION, SOLUTION INTRAVENOUS EVERY 8 HOURS
Status: COMPLETED | OUTPATIENT
Start: 2025-02-03 | End: 2025-02-04

## 2025-02-03 RX ORDER — ONDANSETRON HYDROCHLORIDE 2 MG/ML
INJECTION, SOLUTION INTRAVENOUS AS NEEDED
Status: DISCONTINUED | OUTPATIENT
Start: 2025-02-03 | End: 2025-02-03

## 2025-02-03 RX ORDER — LIDOCAINE HYDROCHLORIDE 10 MG/ML
INJECTION, SOLUTION INFILTRATION; PERINEURAL AS NEEDED
Status: DISCONTINUED | OUTPATIENT
Start: 2025-02-03 | End: 2025-02-03 | Stop reason: HOSPADM

## 2025-02-03 RX ORDER — DIPHENHYDRAMINE HYDROCHLORIDE 50 MG/ML
12.5 INJECTION INTRAMUSCULAR; INTRAVENOUS ONCE AS NEEDED
Status: DISCONTINUED | OUTPATIENT
Start: 2025-02-03 | End: 2025-02-03 | Stop reason: HOSPADM

## 2025-02-03 RX ORDER — PROPOFOL 10 MG/ML
INJECTION, EMULSION INTRAVENOUS AS NEEDED
Status: DISCONTINUED | OUTPATIENT
Start: 2025-02-03 | End: 2025-02-03

## 2025-02-03 RX ORDER — FENTANYL CITRATE 50 UG/ML
25 INJECTION, SOLUTION INTRAMUSCULAR; INTRAVENOUS EVERY 5 MIN PRN
Status: DISCONTINUED | OUTPATIENT
Start: 2025-02-03 | End: 2025-02-03 | Stop reason: HOSPADM

## 2025-02-03 RX ADMIN — Medication 100 MG: at 16:34

## 2025-02-03 RX ADMIN — CEFAZOLIN SODIUM 2 G: 2 INJECTION, SOLUTION INTRAVENOUS at 16:34

## 2025-02-03 RX ADMIN — SODIUM CHLORIDE, POTASSIUM CHLORIDE, SODIUM LACTATE AND CALCIUM CHLORIDE 50 ML/HR: 600; 310; 30; 20 INJECTION, SOLUTION INTRAVENOUS at 10:46

## 2025-02-03 RX ADMIN — LIDOCAINE HYDROCHLORIDE 40 MG: 20 INJECTION, SOLUTION INFILTRATION; PERINEURAL at 07:37

## 2025-02-03 RX ADMIN — OXYCODONE 5 MG: 5 TABLET ORAL at 20:44

## 2025-02-03 RX ADMIN — HYDROMORPHONE HYDROCHLORIDE 0.5 MG: 1 INJECTION, SOLUTION INTRAMUSCULAR; INTRAVENOUS; SUBCUTANEOUS at 08:36

## 2025-02-03 RX ADMIN — HYDROMORPHONE HYDROCHLORIDE 0.2 MG: 0.2 INJECTION, SOLUTION INTRAMUSCULAR; INTRAVENOUS; SUBCUTANEOUS at 10:36

## 2025-02-03 RX ADMIN — HYDROMORPHONE HYDROCHLORIDE 0.5 MG: 1 INJECTION, SOLUTION INTRAMUSCULAR; INTRAVENOUS; SUBCUTANEOUS at 08:28

## 2025-02-03 RX ADMIN — SODIUM CHLORIDE, SODIUM LACTATE, POTASSIUM CHLORIDE, AND CALCIUM CHLORIDE: 600; 310; 30; 20 INJECTION, SOLUTION INTRAVENOUS at 07:30

## 2025-02-03 RX ADMIN — HEPARIN SODIUM 5000 UNITS: 5000 INJECTION INTRAVENOUS; SUBCUTANEOUS at 18:43

## 2025-02-03 RX ADMIN — CARVEDILOL 6.25 MG: 6.25 TABLET, FILM COATED ORAL at 20:43

## 2025-02-03 RX ADMIN — PANTOPRAZOLE SODIUM 40 MG: 40 INJECTION, POWDER, FOR SOLUTION INTRAVENOUS at 10:36

## 2025-02-03 RX ADMIN — Medication 100 MCG: at 07:42

## 2025-02-03 RX ADMIN — PROPOFOL 80 MG: 10 INJECTION, EMULSION INTRAVENOUS at 07:37

## 2025-02-03 RX ADMIN — PROPOFOL 120 MG: 10 INJECTION, EMULSION INTRAVENOUS at 07:50

## 2025-02-03 RX ADMIN — POLYETHYLENE GLYCOL 3350 17 G: 17 POWDER, FOR SOLUTION ORAL at 16:34

## 2025-02-03 RX ADMIN — ONDANSETRON 4 MG: 2 INJECTION, SOLUTION INTRAMUSCULAR; INTRAVENOUS at 07:44

## 2025-02-03 RX ADMIN — CEFAZOLIN SODIUM 2 G: 2 INJECTION, SOLUTION INTRAVENOUS at 07:33

## 2025-02-03 RX ADMIN — SUCCINYLCHOLINE CHLORIDE 100 MG: 20 INJECTION, SOLUTION INTRAMUSCULAR; INTRAVENOUS at 07:37

## 2025-02-03 RX ADMIN — ATORVASTATIN CALCIUM 10 MG: 10 TABLET ORAL at 20:43

## 2025-02-03 RX ADMIN — Medication 100 MCG: at 07:55

## 2025-02-03 RX ADMIN — ONDANSETRON 4 MG: 2 INJECTION INTRAMUSCULAR; INTRAVENOUS at 12:01

## 2025-02-03 SDOH — SOCIAL STABILITY: SOCIAL INSECURITY: WERE YOU ABLE TO COMPLETE ALL THE BEHAVIORAL HEALTH SCREENINGS?: YES

## 2025-02-03 SDOH — SOCIAL STABILITY: SOCIAL INSECURITY: WITHIN THE LAST YEAR, HAVE YOU BEEN HUMILIATED OR EMOTIONALLY ABUSED IN OTHER WAYS BY YOUR PARTNER OR EX-PARTNER?: NO

## 2025-02-03 SDOH — ECONOMIC STABILITY: FOOD INSECURITY: WITHIN THE PAST 12 MONTHS, THE FOOD YOU BOUGHT JUST DIDN'T LAST AND YOU DIDN'T HAVE MONEY TO GET MORE.: NEVER TRUE

## 2025-02-03 SDOH — ECONOMIC STABILITY: FOOD INSECURITY: WITHIN THE PAST 12 MONTHS, YOU WORRIED THAT YOUR FOOD WOULD RUN OUT BEFORE YOU GOT THE MONEY TO BUY MORE.: NEVER TRUE

## 2025-02-03 SDOH — SOCIAL STABILITY: SOCIAL INSECURITY: WITHIN THE LAST YEAR, HAVE YOU BEEN AFRAID OF YOUR PARTNER OR EX-PARTNER?: NO

## 2025-02-03 SDOH — SOCIAL STABILITY: SOCIAL INSECURITY: ABUSE: ADULT

## 2025-02-03 SDOH — SOCIAL STABILITY: SOCIAL INSECURITY: HAS ANYONE EVER THREATENED TO HURT YOUR FAMILY OR YOUR PETS?: NO

## 2025-02-03 SDOH — SOCIAL STABILITY: SOCIAL INSECURITY
WITHIN THE LAST YEAR, HAVE YOU BEEN KICKED, HIT, SLAPPED, OR OTHERWISE PHYSICALLY HURT BY YOUR PARTNER OR EX-PARTNER?: NO

## 2025-02-03 SDOH — ECONOMIC STABILITY: INCOME INSECURITY: IN THE PAST 12 MONTHS HAS THE ELECTRIC, GAS, OIL, OR WATER COMPANY THREATENED TO SHUT OFF SERVICES IN YOUR HOME?: NO

## 2025-02-03 SDOH — SOCIAL STABILITY: SOCIAL INSECURITY: ARE YOU OR HAVE YOU BEEN THREATENED OR ABUSED PHYSICALLY, EMOTIONALLY, OR SEXUALLY BY ANYONE?: NO

## 2025-02-03 SDOH — SOCIAL STABILITY: SOCIAL INSECURITY
WITHIN THE LAST YEAR, HAVE YOU BEEN RAPED OR FORCED TO HAVE ANY KIND OF SEXUAL ACTIVITY BY YOUR PARTNER OR EX-PARTNER?: NO

## 2025-02-03 SDOH — SOCIAL STABILITY: SOCIAL INSECURITY: DO YOU FEEL ANYONE HAS EXPLOITED OR TAKEN ADVANTAGE OF YOU FINANCIALLY OR OF YOUR PERSONAL PROPERTY?: NO

## 2025-02-03 SDOH — SOCIAL STABILITY: SOCIAL INSECURITY: HAVE YOU HAD THOUGHTS OF HARMING ANYONE ELSE?: NO

## 2025-02-03 SDOH — SOCIAL STABILITY: SOCIAL INSECURITY: HAVE YOU HAD ANY THOUGHTS OF HARMING ANYONE ELSE?: NO

## 2025-02-03 SDOH — SOCIAL STABILITY: SOCIAL INSECURITY
ASK PARENT OR GUARDIAN: ARE THERE TIMES WHEN YOU, YOUR CHILD(REN), OR ANY MEMBER OF YOUR HOUSEHOLD FEEL UNSAFE, HARMED, OR THREATENED AROUND PERSONS WITH WHOM YOU KNOW OR LIVE?: NO

## 2025-02-03 SDOH — SOCIAL STABILITY: SOCIAL INSECURITY: DOES ANYONE TRY TO KEEP YOU FROM HAVING/CONTACTING OTHER FRIENDS OR DOING THINGS OUTSIDE YOUR HOME?: NO

## 2025-02-03 SDOH — SOCIAL STABILITY: SOCIAL INSECURITY: DO YOU FEEL UNSAFE GOING BACK TO THE PLACE WHERE YOU ARE LIVING?: NO

## 2025-02-03 SDOH — SOCIAL STABILITY: SOCIAL INSECURITY: ARE THERE ANY APPARENT SIGNS OF INJURIES/BEHAVIORS THAT COULD BE RELATED TO ABUSE/NEGLECT?: NO

## 2025-02-03 ASSESSMENT — PAIN - FUNCTIONAL ASSESSMENT
PAIN_FUNCTIONAL_ASSESSMENT: 0-10

## 2025-02-03 ASSESSMENT — LIFESTYLE VARIABLES
HOW MANY STANDARD DRINKS CONTAINING ALCOHOL DO YOU HAVE ON A TYPICAL DAY: PATIENT DOES NOT DRINK
HOW OFTEN DO YOU HAVE 6 OR MORE DRINKS ON ONE OCCASION: NEVER
AUDIT-C TOTAL SCORE: 0
SKIP TO QUESTIONS 9-10: 1
HOW OFTEN DO YOU HAVE A DRINK CONTAINING ALCOHOL: NEVER
AUDIT-C TOTAL SCORE: 0

## 2025-02-03 ASSESSMENT — ACTIVITIES OF DAILY LIVING (ADL)
DRESSING YOURSELF: INDEPENDENT
LACK_OF_TRANSPORTATION: NO
PATIENT'S MEMORY ADEQUATE TO SAFELY COMPLETE DAILY ACTIVITIES?: NO
ASSISTIVE_DEVICE: DENTURES LOWER;DENTURES UPPER;EYEGLASSES
ADEQUATE_TO_COMPLETE_ADL: NO
TOILETING: INDEPENDENT
HEARING - LEFT EAR: FUNCTIONAL
WALKS IN HOME: INDEPENDENT
JUDGMENT_ADEQUATE_SAFELY_COMPLETE_DAILY_ACTIVITIES: NO
FEEDING YOURSELF: INDEPENDENT
LACK_OF_TRANSPORTATION: NO
GROOMING: INDEPENDENT
HEARING - RIGHT EAR: FUNCTIONAL
BATHING: INDEPENDENT

## 2025-02-03 ASSESSMENT — COGNITIVE AND FUNCTIONAL STATUS - GENERAL
CLIMB 3 TO 5 STEPS WITH RAILING: A LOT
WALKING IN HOSPITAL ROOM: A LITTLE
PATIENT BASELINE BEDBOUND: NO
TURNING FROM BACK TO SIDE WHILE IN FLAT BAD: A LITTLE
WALKING IN HOSPITAL ROOM: A LITTLE
CLIMB 3 TO 5 STEPS WITH RAILING: A LITTLE
MOBILITY SCORE: 19
MOBILITY SCORE: 22
MOVING TO AND FROM BED TO CHAIR: A LITTLE
TOILETING: A LITTLE
DAILY ACTIVITIY SCORE: 23
DAILY ACTIVITIY SCORE: 24

## 2025-02-03 ASSESSMENT — PAIN DESCRIPTION - LOCATION
LOCATION: ABDOMEN
LOCATION: ABDOMEN

## 2025-02-03 ASSESSMENT — PAIN SCALES - GENERAL
PAINLEVEL_OUTOF10: 8
PAINLEVEL_OUTOF10: 5 - MODERATE PAIN
PAINLEVEL_OUTOF10: 0 - NO PAIN
PAINLEVEL_OUTOF10: 8
PAINLEVEL_OUTOF10: 1
PAINLEVEL_OUTOF10: 4
PAINLEVEL_OUTOF10: 8
PAIN_LEVEL: 0
PAINLEVEL_OUTOF10: 1
PAINLEVEL_OUTOF10: 0 - NO PAIN
PAINLEVEL_OUTOF10: 0 - NO PAIN
PAINLEVEL_OUTOF10: 10 - WORST POSSIBLE PAIN
PAINLEVEL_OUTOF10: 4
PAINLEVEL_OUTOF10: 3

## 2025-02-03 ASSESSMENT — PATIENT HEALTH QUESTIONNAIRE - PHQ9
1. LITTLE INTEREST OR PLEASURE IN DOING THINGS: NOT AT ALL
SUM OF ALL RESPONSES TO PHQ9 QUESTIONS 1 & 2: 0
2. FEELING DOWN, DEPRESSED OR HOPELESS: NOT AT ALL

## 2025-02-03 ASSESSMENT — PAIN SCALES - PAIN ASSESSMENT IN ADVANCED DEMENTIA (PAINAD): TOTALSCORE: MEDICATION (SEE MAR)

## 2025-02-03 ASSESSMENT — PAIN DESCRIPTION - ORIENTATION: ORIENTATION: LOWER;MID

## 2025-02-03 ASSESSMENT — PAIN DESCRIPTION - DESCRIPTORS: DESCRIPTORS: DULL

## 2025-02-03 ASSESSMENT — COLUMBIA-SUICIDE SEVERITY RATING SCALE - C-SSRS
1. IN THE PAST MONTH, HAVE YOU WISHED YOU WERE DEAD OR WISHED YOU COULD GO TO SLEEP AND NOT WAKE UP?: NO
6. HAVE YOU EVER DONE ANYTHING, STARTED TO DO ANYTHING, OR PREPARED TO DO ANYTHING TO END YOUR LIFE?: NO
2. HAVE YOU ACTUALLY HAD ANY THOUGHTS OF KILLING YOURSELF?: NO

## 2025-02-03 NOTE — INTERVAL H&P NOTE
H&P reviewed. The patient was examined and there are no changes to the H&P.    Joseph Luna MD  Thoracic Surgeon  [facility]   of Medicine  [facility]  Office phone: [phone]  Fax: [phone]  Pager: [phone]

## 2025-02-03 NOTE — ANESTHESIA POSTPROCEDURE EVALUATION
Patient: Wilson Suresh    Procedure Summary       Date: 02/03/25 Room / Location: [facility]  / [facility]    Anesthesia Start: 0730 Anesthesia Stop:     Procedure: EGD, INSERTION OF PEG TUBE Diagnosis:       Adenocarcinoma of gastroesophageal junction (Multi)      (Adenocarcinoma of gastroesophageal junction (Multi) [C16.0])    Surgeons: Joseph Luna MD Responsible Provider: Nagi Khan DO    Anesthesia Type: general ASA Status: 3            Anesthesia Type: general    Vitals Value Taken Time   /72 02/03/25 0811   Temp 36 02/03/25 0811   Pulse 66 02/03/25 0811   Resp 16 02/03/25 0811   SpO2 100 02/03/25 0811       Anesthesia Post Evaluation    Patient location during evaluation: PACU  Patient participation: complete - patient participated  Level of consciousness: awake and alert  Pain score: 0  Pain management: adequate  Airway patency: patent  Cardiovascular status: acceptable  Respiratory status: acceptable  Hydration status: acceptable  Postoperative Nausea and Vomiting: none        No notable events documented.

## 2025-02-03 NOTE — PROGRESS NOTES
02/03/25 1650   Discharge Planning   Living Arrangements Spouse/significant other   Support Systems Spouse/significant other   Assistance Needed independent prior to admission   Type of Residence Private residence   Who is requesting discharge planning? Provider   Home or Post Acute Services In home services   Type of Home Care Services Home nursing visits   Expected Discharge Disposition Home H   Does the patient need discharge transport arranged? No   Transportation Needs   In the past 12 months, has lack of transportation kept you from medical appointments or from getting medications? no   In the past 12 months, has lack of transportation kept you from meetings, work, or from getting things needed for daily living? No   Patient Choice   Provider Choice list and CMS website (https://medicare.gov/care-compare#search) for post-acute Quality and Resource Measure Data were provided and reviewed with: Patient   Patient / Family choosing to utilize agency / facility established prior to hospitalization Yes   Stroke Family Assessment   Stroke Family Assessment Needed No   Intensity of Service   Intensity of Service 0-30 min     Pt with pmhx of MI status post PCI in 1980s, heart failure, bladder cancer, COPD, TIA 20 years ago, and hypertension. Being treated for   GEJ adenocarcinoma.  Admitted for PEG tube insertion.   Pt resides with spouse, is independent from home and follows with [facility] Saumya Griffith from [facility].  Pt prefers [facility] and after further discussion referral to [facility] to assist with Tube feed needs.   Dietician recommendations and H & p have been sent via care port.  Spouse will assist pt with care of PEG tube and tube feeds.    Care transitions team will continue to follow for needs.  Plan is home with [facility] .

## 2025-02-03 NOTE — NURSING NOTE
Pt arrived in stable condition with family at bedside. Admission assessment completed. Educated family on new orders and PEG tube. Medicated per MAR.

## 2025-02-03 NOTE — CARE PLAN
Problem: Skin  Goal: Decreased wound size/increased tissue granulation at next dressing change  Outcome: Progressing  Goal: Participates in plan/prevention/treatment measures  Outcome: Progressing  Goal: Prevent/manage excess moisture  Outcome: Progressing  Goal: Prevent/minimize sheer/friction injuries  Outcome: Progressing  Goal: Promote/optimize nutrition  Outcome: Progressing  Goal: Promote skin healing  Outcome: Progressing     Problem: Pain - Adult  Goal: Verbalizes/displays adequate comfort level or baseline comfort level  Outcome: Progressing     Problem: Safety - Adult  Goal: Free from fall injury  Outcome: Progressing     Problem: Discharge Planning  Goal: Discharge to home or other facility with appropriate resources  Outcome: Progressing     Problem: Chronic Conditions and Co-morbidities  Goal: Patient's chronic conditions and co-morbidity symptoms are monitored and maintained or improved  Outcome: Progressing     Problem: Nutrition  Goal: Nutrient intake appropriate for maintaining nutritional needs  Outcome: Progressing     Problem: Pain  Goal: Takes deep breaths with improved pain control throughout the shift  Outcome: Progressing  Goal: Turns in bed with improved pain control throughout the shift  Outcome: Progressing  Goal: Walks with improved pain control throughout the shift  Outcome: Progressing  Goal: Performs ADL's with improved pain control throughout shift  Outcome: Progressing  Goal: Participates in PT with improved pain control throughout the shift  Outcome: Progressing  Goal: Free from opioid side effects throughout the shift  Outcome: Progressing  Goal: Free from acute confusion related to pain meds throughout the shift  Outcome: Progressing

## 2025-02-03 NOTE — ANESTHESIA PREPROCEDURE EVALUATION
"Wilson Suresh is a 84 y.o. male here for:    EGD, PEG, possible jejunostomy  With Joseph Luna MD  Pre-Op Diagnosis Codes:      * Cancer of stomach [C16.0]    Relevant Problems   Cardiac  Echo 1/17/2025:  EF 50%, mild Pulm HTN, mild TR   (+) Atherosclerotic heart disease of native coronary artery without angina pectoris   (+) Essential hypertension   (+) First degree AV block   (+) Intermittent chest pain   (+) Mixed hyperlipidemia   (+) Premature ventricular contractions      Pulmonary   (+) Chronic obstructive pulmonary disease (Multi)      GI   (+) Carcinoma of cardio-esophageal junction (Multi)   (+) Dysphagia   (+) Hiatal hernia      Liver   (+) Carcinoma of cardio-esophageal junction (Multi)       No results found for: \"HGB\", \"HCT\", \"WBC\", \"PLT\", \"GLU\", \"NA\", \"K\", \"CL\", \"CREATININE\", \"BUN\"    Social History     Tobacco Use   Smoking Status Former    Types: Cigarettes   Smokeless Tobacco Never       Allergies   Allergen Reactions    Hydrocodone Syncope    Hydrocodone-Acetaminophen Other     Other Reaction(s): Passed out - Not a true allergy - CAN HAVE TYLENOL       Current Outpatient Medications   Medication Instructions    aspirin-calcium carbonate 81 mg-300 mg calcium(777 mg) tablet oral, As needed    atorvastatin (LIPITOR) 10 mg, oral, Nightly    carvedilol (COREG) 6.25 mg, oral, 2 times daily    mometasone-formoterol (Dulera) 200-5 mcg/actuation inhaler 1 puff, 2 times daily RT    ondansetron (ZOFRAN) 4 mg, Every 8 hours PRN    oxyCODONE (OXY-IR) 5 mg, Every 4 hours PRN    pantoprazole (PROTONIX) 40 mg, Daily before breakfast    tiotropium (Spiriva with HandiHaler) 18 mcg inhalation capsule Daily RT       Past Surgical History:   Procedure Laterality Date    APPENDECTOMY  01/22/2016    Appendectomy    CATARACT EXTRACTION      OTHER SURGICAL HISTORY  01/22/2016    Lipectomy    OTHER SURGICAL HISTORY  01/22/2016    Cardiac Cath Lesion 4, Primary Treat Device: Stent    OTHER SURGICAL HISTORY  10/04/2022    " Bladder surgery    OTHER SURGICAL HISTORY  01/28/2022    Inguinal hernia repair    OTHER SURGICAL HISTORY  01/28/2022    Colonoscopy    UMBILICAL HERNIA REPAIR  01/22/2016    Umbilical Hernia Repair       Family History   Problem Relation Name Age of Onset    Other (acut myocardial infarction) Mother      Lung cancer Father      Diabetes type I Sister      Other (myocardial infarction) Sister         NPO Details:  No data recorded    Physical Exam    Airway  Mallampati: II  TM distance: >3 FB  Neck ROM: full     Cardiovascular - normal exam     Dental   (+) upper dentures, lower dentures     Pulmonary - normal exam     Abdominal            Anesthesia Plan    History of general anesthesia?: yes  History of complications of general anesthesia?: no    ASA 3     general     The patient is not a current smoker.    intravenous induction   Postoperative administration of opioids is intended.  Trial extubation is planned.  Anesthetic plan and risks discussed with patient.    Plan discussed with CRNA.

## 2025-02-03 NOTE — CONSULTS
"Nutrition Initial Assessment:   Nutrition Assessment    Reason for Assessment: Enteral assessment/recommendation (TF)    Patient is a 84 y.o. 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 Met with pt post PEG tube placement. Pt reports UBW of 135 lbs, states he has been losing wt over the past 6 months due to increased difficulty swallowing. Pt states appetite has diminished over the past 6  months, has not been able to tolerate water without causing pt to throw up. Pt denies GI symptoms at this time, complains of soreness at PEG tube site. See TF recommendations below.  Vitamin/Herbal Supplement Use: none per home med list       Anthropometrics:  Height: 167.6 cm (5' 6\")   Weight: 46.1 kg (101 lb 10.1 oz)   BMI (Calculated): 16.41  IBW/kg (Dietitian Calculated): 64.5 kg  Percent of IBW: 71 %       Weight History:   Wt Readings from Last 10 Encounters:   02/03/25 46.1 kg (101 lb 10.1 oz)   01/31/25 50.8 kg (112 lb)   01/24/25 50.8 kg (112 lb)   11/19/24 50.9 kg (112 lbs)   06/25/24 53.2 kg (117 lbs)   04/29/24 53.5 kg (118 lb)   04/06/23 55.8 kg (123 lb)   10/04/22 55.3 kg (122 lb)   03/10/22 58.1 kg (128 lb)      Weight Change %:  Weight History / % Weight Change: 11 lb, 9.8% significant wt loss in 1 week; 16 lb, 13.6% wt loss in 8 months  Significant Weight Loss: Yes  Interpretation of Weight Loss: >2% in 1 week    Nutrition Focused Physical Exam Findings:    Subcutaneous Fat Loss:   Orbital Fat Pads: Severe (dark circles, hollowing and loose skin)  Buccal Fat Pads: Severe (hollow, sunken and narrow face)  Triceps: Severe (negligible fat tissue)  Muscle " "Wasting:  Temporalis: Severe (hollowed scooping depression)  Pectoralis (Clavicular Region): Severe (protruding prominent clavicle)  Deltoid/Trapezius: Severe (squared shoulders, acromion process prominent)  Edema:  Edema: none  Physical Findings:  Skin: Negative (for PI)  Mouth Findings: Dysphagia, Odynophagia    Nutrition Significant Labs:  BMP Trend:   Results from last 7 days   Lab Units 02/03/25  0938   GLUCOSE mg/dL 118*   CALCIUM mg/dL 9.1   SODIUM mmol/L 137   POTASSIUM mmol/L 4.6   CO2 mmol/L 24   CHLORIDE mmol/L 106   BUN mg/dL 24*   CREATININE mg/dL 1.15    , A1C:No results found for: \"HGBA1C\", BG POCT trend:        Nutrition Specific Medications:  Scheduled medications  atorvastatin, 10 mg, oral, Nightly  [START ON 2/4/2025] pantoprazole, 40 mg, oral, Daily before breakfast  pantoprazole, 40 mg, intravenous, Daily  polyethylene glycol, 17 g, oral, Daily    Continuous medications  lactated Ringer's, 50 mL/hr, Last Rate: 50 mL/hr (02/03/25 1046)        I/O:   Last BM Date: 02/02/25; Stool Appearance: Loose (02/03/25 0609)    Dietary Orders (From admission, onward)       Start     Ordered    02/03/25 1022  May Participate in Room Service  ( ROOM SERVICE MAY PARTICIPATE)  Once        Question:  .  Answer:  Yes    02/03/25 1021    02/03/25 1009  Adult diet Clear Liquid  Diet effective now        Question Answer Comment   Diet type Clear Liquid    Special Instructions: Advance as tolerated        02/03/25 1008                     Estimated Needs:   Total Energy Estimated Needs in 24 hours (kCal): 1600 kCal  Method for Estimating Needs: 35 kcal/kg ABW  Total Protein Estimated Needs in 24 Hours (g): 55 g  Method for Estimating 24 Hour Protein Needs: 55-69 g (1.2-1.5 g/kg ABW)  Total Fluid Estimated Needs in 24 Hours (mL): 1600 mL  Method for Estimating 24 Hour Fluid Needs: 1 ml/kcal or per MD  Patient on Order Fluid Restriction: No        Nutrition Diagnosis   Malnutrition Diagnosis  Patient has Malnutrition " Diagnosis: Yes  Diagnosis Status: New  Malnutrition Diagnosis: Severe malnutrition related to acute disease or injury  Related to: catabolic illness, dysphagia  As Evidenced by: severe muscle wasting, severe fat loss, <75% of estimated energy requirements in > 1 month, >2% wt loss in 1 week    Nutrition Diagnosis  Patient has Nutrition Diagnosis: Yes  Diagnosis Status (1): New  Nutrition Diagnosis 1: Swallowing difficulty  Related to (1): carcinoma of cardio-esophageal junction  As Evidenced by (1): need for EN to meet nutrition needs       Nutrition Interventions/Recommendations   Nutrition prescription for enteral nutrition    Nutrition Recommendations:  Individualized Nutrition Prescription Provided for : TF of Jevity 1.5 to start at 20 ml/hr, advance by 10 ml every 12 hours to goal rate of 40 ml/hr continuous via PEG tube (provides 1440 kcal, 61 g protein, and 730 ml free H2O). 125 ml flushes q4h or per MD.    Nutrition Interventions/Goals:   Interventions: Vitamin and mineral supplement therapy  Vitamin and Mineral Supplement Therapy: Thiamin supplement therapy  Goal: Can add 100 mg IV thiamine for 5-7 days help attenuate any refeeding syndrome risk  Coordination of Care with Providers: Provider, Other (Comment) (TCC)  Goal: secure chat  Additional Interventions: Refeeding risk, recommend 100 mg thiamin to start with TF and continue x 5-7 days. RFP and Magneseum labs every 12 hours, repleate electrolytes as needed prior to advancing TF. Once pt is tolerating TF at goal rate, recommend transition to bolus feeds for home: 240 ml bolus (1 carton) of Jevity 1.5 or equivalent 4x/day (provides 1440 kcal, 61 g protein and 730 ml free water) Flush with 60 ml water before and after each feed. Pt will require an additional 180 ml water flush between feeds 2x/day.      Education Documentation  No documentation found.     Pt denies questions at this time. Will provide pt with PEG tube Guide prior to discharge        Nutrition Monitoring and Evaluation   Food/Nutrient Related History Monitoring  Monitoring and Evaluation Plan: Enteral and parenteral nutrition intake determination, Estimated Energy Intake  Estimated Energy Intake: Energy intake greater or equal to 75% of estimated energy needs  Enteral and Parenteral Nutrition Intake Determination: Enteral nutrition intake - Tolerate TF at goal rate, Enteral nutrition intake - Prevent refeeding, Enteral nutrition intake - To meet > 75% estimated energy needs    Anthropometric Measurements  Monitoring and Evaluation Plan: Body weight  Body Weight: Body weight - Maintain stable weight    Biochemical Data, Medical Tests and Procedures  Monitoring and Evaluation Plan: Electrolyte/renal panel, Glucose/endocrine profile  Electrolyte and Renal Panel: Electrolytes within normal limits  Glucose/Endocrine Profile: Glucose within normal limits ( mg/dL)              Time Spent (min): 40 minutes

## 2025-02-03 NOTE — OP NOTE
Date: 2/3/2025  OR Location: [facility]    Name: Wilson Suresh, : 1940, Age: 84 y.o., MRN: [id number], Sex: male    Preop Diagnosis: GEJ adenocarcinoma  Postop Diagnosis: GEJ adenocarinoma    Procedures:  EGD, INSERTION OF PEG TUBE  61168 - MI EGD PERCUTANEOUS PLACEMENT GASTROSTOMY TUBE      Surgeons:  Joseph Luna MD    Resident/Fellow/Other Assistant:  Surgeons and Role:  * No surgeons found with a matching role *    Anesthesia: General  ASA: III  Anesthesia Staff: Anesthesiologist: Nagi Khan DO  CRNA: Vinayak Coulter APRN-CRNA  Estimated Blood Loss: 0mL  Intra-op Medications:   Administrations occurring from 0715 to 0815 on 25:   Medication Name Total Dose   lidocaine (Xylocaine) 10 mg/mL (1 %) injection 0.75 mL   LR infusion Cannot be calculated   lidocaine (Xylocaine) injection 2 % 40 mg   ondansetron 2 mg/mL 4 mg   phenylephrine 100 mcg/mL syringe 10 mL (prefilled) 200 mcg   propofol (Diprivan) injection 10 mg/mL 200 mg   succinylcholine 20 mg/mL vial 100 mg   ceFAZolin (Ancef) 2 g in dextrose (iso)  mL 2 g            Anesthesia Record               Intraprocedure I/O Totals          Intake    LR infusion 300.00 mL    Total Intake 300 mL          Specimen: No specimens collected     Staff:   Circulator: Luci Gruber RN  Scrub Person: China Caballero    Findings: GEJ bx proven cancer from extending from GEJ which at 36cm to 45cm at gastric cardia. Siewert III GEJ adenocarcinoma. Traversed with adult scope.     Indication:  This is a 84-year-old gentleman with newly identified GE junction adenocarcinoma with dysphagia.  He also has recurrent hiatal hernia.  I offer him PEG tube for nutrition support.  Given the obstructing nature of the tumor, I may need to perform open gastrostomy.  The alternative is Dobbhoff tube placement.  The risk of surgery include bleeding, infection, perforation, dysphagia, and postop pain.  The patient consented to proceed with surgery.    The patient was seen  in the preoperative area. The risks, benefits, complications, treatment options, non-operative alternatives, expected recovery and outcomes were discussed with the patient. The possibilities of reaction to medication, pulmonary aspiration, injury to surrounding structures, bleeding, recurrent infection, the need for additional procedures, failure to diagnose a condition, and creating a complication requiring transfusion or operation were discussed with the patient. The patient concurred with the proposed plan, giving informed consent.  The site of surgery was properly noted/marked if necessary per policy. The patient has been actively warmed in preoperative area. Preoperative antibiotics have been ordered and given within 1 hours of incision. Venous thrombosis prophylaxis have been ordered including bilateral sequential compression devices and chemical prophylaxis.     Operation Details:  Patient was brought to operation room. A time-out was performed. Patient name, MRN and procedure were confirmed and all staff were in agreement. SCDs were placed and working. Ancef was given for this procedure. Patient was induced and intubated with a single lumen tube without issue. A pre-incision time out was performed. All staff were in agreement to proceed.      I used an adult gastroscope to intubate the esophagus.  There was pooling of saliva in the proximal esophagus.  This was suctioned out.  I encountered the biopsy-proven adenocarcinoma at 36 cm from incisor which emerged from gastric esophageal junction.  It is a friable mass.  I was able to traverse the mass into the stomach.  The mass ends at 45 centimeter from incisor.  In the retroflexed view, the mass involved the gastric cardia.  The rest of stomach was healthy and no pathology identified. The pylorus was widely patent. The D1 and D2 were reached with clean bile and healthy mucosa. I withdrew the scope back to the stomach.     I then used transillumination and  finger invagination to identify a spot in the upper abdomen towards the atrium of the stomach.  The abdomen was prepped and draped in sterile fashion.  A needle was used to gain access into the stomach under visual guidance.  Then a wire was passed through the needle into stomach which was snared through the scope.  The wire was then delivered to the mouth and connected to the PEG tube.  The PEG tube was then pulled into the stomach followed by the gastroscope.  The PEG was sitting comfortably in the atrium and spinning freely.  The PEG bumper was at 7.5 cm at skin.  I ensured hemostasis and desufflated the stomach. Antibiotic ointment was used on the skin level.  All count was correct.  Patient was then allowed to awaken from anesthesia and brought to recovery room in stable condition.    I was present for the entire duration of the procedure.    Joseph Luna MD  Thoracic Surgeon  [facility]  [facility]  Office phone: [phone]  Fax: [phone]  Pager: [phone]

## 2025-02-03 NOTE — PERIOPERATIVE NURSING NOTE
Pts pain 10/10 was medicated per the orders twice. SPO2 drops to 90s, spoke with anesthesia. Pt now rates pain 4/10 and states the pain is tolerable. SPO2 94%.  Pt resting comfortably.

## 2025-02-03 NOTE — ANESTHESIA PROCEDURE NOTES
Airway  Date/Time: 2/3/2025 7:41 AM  Urgency: elective    Airway not difficult    Staffing  Performed: CRNA   Authorized by: Nagi Khan DO    Performed by: LISETH Chance-ROGER  Patient location during procedure: OR    Indications and Patient Condition  Indications for airway management: anesthesia  Spontaneous ventilation: present  Sedation level: deep  Preoxygenated: yes  Patient position: ramp  MILS maintained throughout  Mask difficulty assessment: 0 - not attempted  Planned trial extubation    Final Airway Details  Final airway type: endotracheal airway      Successful airway: ETT  Cuffed: yes   Successful intubation technique: direct laryngoscopy  Facilitating devices/methods: intubating stylet and cricoid pressure  Endotracheal tube insertion site: oral  Blade: Shlomo  Blade size: #4  ETT size (mm): 7.5  Cormack-Lehane Classification: grade I - full view of glottis  Placement verified by: chest auscultation and capnometry   Measured from: gums  ETT to gums (cm): 23

## 2025-02-04 ENCOUNTER — APPOINTMENT (OUTPATIENT)
Dept: RADIOLOGY | Facility: HOSPITAL | Age: 85
End: 2025-02-04
Payer: MEDICARE

## 2025-02-04 LAB
ANION GAP SERPL CALC-SCNC: 10 MMOL/L (ref 10–20)
ANION GAP SERPL CALC-SCNC: 10 MMOL/L (ref 10–20)
ANION GAP SERPL CALC-SCNC: 11 MMOL/L (ref 10–20)
ATRIAL RATE: 84 BPM
BUN SERPL-MCNC: 17 MG/DL (ref 6–23)
BUN SERPL-MCNC: 20 MG/DL (ref 6–23)
BUN SERPL-MCNC: 20 MG/DL (ref 6–23)
CALCIUM SERPL-MCNC: 8.6 MG/DL (ref 8.6–10.3)
CALCIUM SERPL-MCNC: 8.8 MG/DL (ref 8.6–10.3)
CALCIUM SERPL-MCNC: 9.1 MG/DL (ref 8.6–10.3)
CHLORIDE SERPL-SCNC: 103 MMOL/L (ref 98–107)
CHLORIDE SERPL-SCNC: 103 MMOL/L (ref 98–107)
CHLORIDE SERPL-SCNC: 104 MMOL/L (ref 98–107)
CO2 SERPL-SCNC: 26 MMOL/L (ref 21–32)
CO2 SERPL-SCNC: 27 MMOL/L (ref 21–32)
CO2 SERPL-SCNC: 27 MMOL/L (ref 21–32)
CREAT SERPL-MCNC: 1.02 MG/DL (ref 0.5–1.3)
CREAT SERPL-MCNC: 1.06 MG/DL (ref 0.5–1.3)
CREAT SERPL-MCNC: 1.1 MG/DL (ref 0.5–1.3)
EGFRCR SERPLBLD CKD-EPI 2021: 67 ML/MIN/1.73M*2
EGFRCR SERPLBLD CKD-EPI 2021: 70 ML/MIN/1.73M*2
EGFRCR SERPLBLD CKD-EPI 2021: 73 ML/MIN/1.73M*2
GLUCOSE SERPL-MCNC: 110 MG/DL (ref 74–99)
GLUCOSE SERPL-MCNC: 113 MG/DL (ref 74–99)
GLUCOSE SERPL-MCNC: 146 MG/DL (ref 74–99)
MAGNESIUM SERPL-MCNC: 2 MG/DL (ref 1.6–2.4)
MAGNESIUM SERPL-MCNC: 2.01 MG/DL (ref 1.6–2.4)
MAGNESIUM SERPL-MCNC: 2.03 MG/DL (ref 1.6–2.4)
P AXIS: 63 DEGREES
P OFFSET: 145 MS
P ONSET: 103 MS
POTASSIUM SERPL-SCNC: 4.1 MMOL/L (ref 3.5–5.3)
POTASSIUM SERPL-SCNC: 4.5 MMOL/L (ref 3.5–5.3)
POTASSIUM SERPL-SCNC: 4.6 MMOL/L (ref 3.5–5.3)
PR INTERVAL: 222 MS
Q ONSET: 214 MS
QRS COUNT: 14 BEATS
QRS DURATION: 98 MS
QT INTERVAL: 404 MS
QTC CALCULATION(BAZETT): 477 MS
QTC FREDERICIA: 451 MS
R AXIS: -89 DEGREES
SODIUM SERPL-SCNC: 135 MMOL/L (ref 136–145)
SODIUM SERPL-SCNC: 136 MMOL/L (ref 136–145)
SODIUM SERPL-SCNC: 136 MMOL/L (ref 136–145)
T AXIS: 56 DEGREES
T OFFSET: 416 MS
VENTRICULAR RATE: 84 BPM

## 2025-02-04 PROCEDURE — 1200000002 HC GENERAL ROOM WITH TELEMETRY DAILY

## 2025-02-04 PROCEDURE — 97161 PT EVAL LOW COMPLEX 20 MIN: CPT | Mod: GP

## 2025-02-04 PROCEDURE — 7100000011 HC EXTENDED STAY RECOVERY HOURLY - NURSING UNIT

## 2025-02-04 PROCEDURE — 36415 COLL VENOUS BLD VENIPUNCTURE: CPT | Performed by: NURSE PRACTITIONER

## 2025-02-04 PROCEDURE — 2500000001 HC RX 250 WO HCPCS SELF ADMINISTERED DRUGS (ALT 637 FOR MEDICARE OP): Performed by: NURSE PRACTITIONER

## 2025-02-04 PROCEDURE — G0378 HOSPITAL OBSERVATION PER HR: HCPCS

## 2025-02-04 PROCEDURE — 80048 BASIC METABOLIC PNL TOTAL CA: CPT | Performed by: NURSE PRACTITIONER

## 2025-02-04 PROCEDURE — 84520 ASSAY OF UREA NITROGEN: CPT | Performed by: NURSE PRACTITIONER

## 2025-02-04 PROCEDURE — 99232 SBSQ HOSP IP/OBS MODERATE 35: CPT | Performed by: NURSE PRACTITIONER

## 2025-02-04 PROCEDURE — 96372 THER/PROPH/DIAG INJ SC/IM: CPT | Performed by: NURSE PRACTITIONER

## 2025-02-04 PROCEDURE — 3E0G76Z INTRODUCTION OF NUTRITIONAL SUBSTANCE INTO UPPER GI, VIA NATURAL OR ARTIFICIAL OPENING: ICD-10-PCS | Performed by: NURSE PRACTITIONER

## 2025-02-04 PROCEDURE — 74018 RADEX ABDOMEN 1 VIEW: CPT | Performed by: RADIOLOGY

## 2025-02-04 PROCEDURE — 2500000004 HC RX 250 GENERAL PHARMACY W/ HCPCS (ALT 636 FOR OP/ED): Performed by: NURSE PRACTITIONER

## 2025-02-04 PROCEDURE — 74018 RADEX ABDOMEN 1 VIEW: CPT

## 2025-02-04 PROCEDURE — 83735 ASSAY OF MAGNESIUM: CPT | Performed by: NURSE PRACTITIONER

## 2025-02-04 PROCEDURE — 94760 N-INVAS EAR/PLS OXIMETRY 1: CPT

## 2025-02-04 RX ORDER — ACETAMINOPHEN 160 MG/5ML
650 SOLUTION ORAL EVERY 4 HOURS PRN
Status: DISCONTINUED | OUTPATIENT
Start: 2025-02-04 | End: 2025-02-05 | Stop reason: HOSPADM

## 2025-02-04 RX ORDER — LANOLIN ALCOHOL/MO/W.PET/CERES
100 CREAM (GRAM) TOPICAL DAILY
Status: DISCONTINUED | OUTPATIENT
Start: 2025-02-05 | End: 2025-02-05 | Stop reason: HOSPADM

## 2025-02-04 RX ORDER — POLYETHYLENE GLYCOL 3350 17 G/17G
17 POWDER, FOR SOLUTION ORAL DAILY
Status: DISCONTINUED | OUTPATIENT
Start: 2025-02-05 | End: 2025-02-05 | Stop reason: HOSPADM

## 2025-02-04 RX ORDER — ESOMEPRAZOLE MAGNESIUM 20 MG/1
40 GRANULE, DELAYED RELEASE ORAL
Status: DISCONTINUED | OUTPATIENT
Start: 2025-02-05 | End: 2025-02-05 | Stop reason: HOSPADM

## 2025-02-04 RX ORDER — KETOROLAC TROMETHAMINE 30 MG/ML
15 INJECTION, SOLUTION INTRAMUSCULAR; INTRAVENOUS EVERY 8 HOURS PRN
Status: DISCONTINUED | OUTPATIENT
Start: 2025-02-04 | End: 2025-02-05 | Stop reason: HOSPADM

## 2025-02-04 RX ORDER — BISACODYL 10 MG/1
10 SUPPOSITORY RECTAL ONCE
Status: COMPLETED | OUTPATIENT
Start: 2025-02-04 | End: 2025-02-04

## 2025-02-04 RX ORDER — CARVEDILOL 6.25 MG/1
6.25 TABLET ORAL 2 TIMES DAILY
Status: DISCONTINUED | OUTPATIENT
Start: 2025-02-04 | End: 2025-02-05 | Stop reason: HOSPADM

## 2025-02-04 RX ORDER — ATORVASTATIN CALCIUM 10 MG/1
10 TABLET, FILM COATED ORAL NIGHTLY
Status: DISCONTINUED | OUTPATIENT
Start: 2025-02-04 | End: 2025-02-05 | Stop reason: HOSPADM

## 2025-02-04 RX ADMIN — FLUTICASONE FUROATE AND VILANTEROL TRIFENATATE 1 PUFF: 200; 25 POWDER RESPIRATORY (INHALATION) at 06:14

## 2025-02-04 RX ADMIN — CARVEDILOL 6.25 MG: 6.25 TABLET, FILM COATED ORAL at 21:18

## 2025-02-04 RX ADMIN — TIOTROPIUM BROMIDE INHALATION SPRAY 2 PUFF: 3.12 SPRAY, METERED RESPIRATORY (INHALATION) at 06:14

## 2025-02-04 RX ADMIN — HEPARIN SODIUM 5000 UNITS: 5000 INJECTION INTRAVENOUS; SUBCUTANEOUS at 01:00

## 2025-02-04 RX ADMIN — Medication 100 MG: at 08:48

## 2025-02-04 RX ADMIN — PANTOPRAZOLE SODIUM 40 MG: 40 INJECTION, POWDER, FOR SOLUTION INTRAVENOUS at 08:49

## 2025-02-04 RX ADMIN — KETOROLAC TROMETHAMINE 15 MG: 30 INJECTION, SOLUTION INTRAMUSCULAR at 11:40

## 2025-02-04 RX ADMIN — HEPARIN SODIUM 5000 UNITS: 5000 INJECTION INTRAVENOUS; SUBCUTANEOUS at 17:39

## 2025-02-04 RX ADMIN — CEFAZOLIN SODIUM 2 G: 2 INJECTION, SOLUTION INTRAVENOUS at 00:49

## 2025-02-04 RX ADMIN — ATORVASTATIN CALCIUM 10 MG: 10 TABLET ORAL at 21:18

## 2025-02-04 RX ADMIN — ACETAMINOPHEN 650 MG: 325 SOLUTION ORAL at 23:35

## 2025-02-04 RX ADMIN — BISACODYL 10 MG: 10 SUPPOSITORY RECTAL at 12:25

## 2025-02-04 RX ADMIN — PANTOPRAZOLE SODIUM 40 MG: 40 TABLET, DELAYED RELEASE ORAL at 06:14

## 2025-02-04 RX ADMIN — CARVEDILOL 6.25 MG: 6.25 TABLET, FILM COATED ORAL at 08:48

## 2025-02-04 ASSESSMENT — COGNITIVE AND FUNCTIONAL STATUS - GENERAL
DAILY ACTIVITIY SCORE: 23
TOILETING: A LITTLE
TURNING FROM BACK TO SIDE WHILE IN FLAT BAD: A LITTLE
TURNING FROM BACK TO SIDE WHILE IN FLAT BAD: A LITTLE
STANDING UP FROM CHAIR USING ARMS: A LITTLE
MOVING TO AND FROM BED TO CHAIR: A LITTLE
MOVING TO AND FROM BED TO CHAIR: A LITTLE
WALKING IN HOSPITAL ROOM: A LITTLE
MOVING TO AND FROM BED TO CHAIR: A LITTLE
DAILY ACTIVITIY SCORE: 23
WALKING IN HOSPITAL ROOM: A LITTLE
CLIMB 3 TO 5 STEPS WITH RAILING: A LOT
CLIMB 3 TO 5 STEPS WITH RAILING: A LOT
MOBILITY SCORE: 19
CLIMB 3 TO 5 STEPS WITH RAILING: TOTAL
MOBILITY SCORE: 16
WALKING IN HOSPITAL ROOM: A LITTLE
TOILETING: A LITTLE
MOVING FROM LYING ON BACK TO SITTING ON SIDE OF FLAT BED WITH BEDRAILS: A LITTLE
MOBILITY SCORE: 19
TURNING FROM BACK TO SIDE WHILE IN FLAT BAD: A LITTLE

## 2025-02-04 ASSESSMENT — PAIN - FUNCTIONAL ASSESSMENT
PAIN_FUNCTIONAL_ASSESSMENT: 0-10

## 2025-02-04 ASSESSMENT — PAIN SCALES - GENERAL
PAINLEVEL_OUTOF10: 0 - NO PAIN
PAINLEVEL_OUTOF10: 8
PAINLEVEL_OUTOF10: 6
PAINLEVEL_OUTOF10: 0 - NO PAIN
PAINLEVEL_OUTOF10: 8

## 2025-02-04 NOTE — PROGRESS NOTES
Wilson Suresh is a 84 y.o. male on day 1 of admission presenting with Carcinoma of cardio-esophageal junction (Multi).    Subjective   Mr. Suresh is an 84 year old male with PMHx of HTN, HLD, CAD with hx of MI in 1980s s/p PCI, COPD, hx of bladder cancer, and hx of TIA. PT recently found to have GEJ adenocarcinoma. He began noting dysphagia over the past 6 months that progressed to where even water will cause him to regurgitate. He was referred to thoracic surgery for consideration of PEG placement.    On February 3, 2025 pt presented to [facility] and underwent EGD, insertion of PEG tube. He tolerated the procedure well. Recovered in PACU and then admitted to intermediate unit. Dietician consulted; appreciate recommendations. Tube feed initiated at 20 mL/hr post PEG placement with instruction to increase by 10 mL/hr Q12H until goal rate of 40 mL/hr.     POD#1: pt is afebrile and hemodynamically stable. Maintaining sinus rhythm with rates in upper 50s-60s; occasional PVC noted on telemetry review. Tube feed infusing at 20 mL/hr. RN attempted to advance this morning but a short time later, pt complained of abdominal cramping and rate decreased back to 20 mL/hr. Cramping did relieve with decreasing rate. Pt denies nausea. On exam, abdomen is soft, mild tenderness to upper quadrants and appropriate tenderness at PEG site. Bowel sounds are hypoactive and noted throughout although quieter on left side. Last BM was 1/31. Will obtain KUB. Keep TF at 20 mL/hr for now and hold if cramping worsens. There were no acute overnight events.       Objective     Physical Exam  Vitals and nursing note reviewed.   Constitutional:       Appearance: Normal appearance. He is ill-appearing.      Comments: Frail    HENT:      Head: Normocephalic and atraumatic.      Right Ear: External ear normal.      Left Ear: External ear normal.      Nose: Nose normal.      Mouth/Throat:      Mouth: Mucous membranes are moist.      Pharynx: Oropharynx is  "clear.   Eyes:      Extraocular Movements: Extraocular movements intact.      Pupils: Pupils are equal, round, and reactive to light.   Cardiovascular:      Rate and Rhythm: Normal rate and regular rhythm.      Comments: Occasional bradycardia into upper 50s; asymptomatic.   Pulmonary:      Effort: Pulmonary effort is normal.      Breath sounds: Normal breath sounds.   Abdominal:      General: Abdomen is flat.      Palpations: Abdomen is soft.      Comments: Hypoactive bowel sounds noted throughout although more hypoactive left upper and lower quadrants compared to right side.   PEG intact; Jevity 1.5 infusing at 20 mL/hr   Last BM 1/31   Musculoskeletal:         General: Normal range of motion.      Cervical back: Normal range of motion.      Comments: Generalized muscle wasting    Skin:     General: Skin is warm and dry.   Neurological:      General: No focal deficit present.      Mental Status: He is alert and oriented to person, place, and time.   Psychiatric:         Mood and Affect: Mood normal.         Behavior: Behavior normal.         Last Recorded Vitals  Blood pressure 116/58, pulse 58, temperature 36.2 °C (97.2 °F), temperature source Temporal, resp. rate 19, height 1.676 m (5' 6\"), weight 46.1 kg (101 lb 10.1 oz), SpO2 90%.  Intake/Output last 3 Shifts:  I/O last 3 completed shifts:  In: 1038.3 (22.5 mL/kg) [I.V.:1038.3 (22.5 mL/kg)]  Out: - (0 mL/kg)   Weight: 46.1 kg     Relevant Results  Scheduled medications  atorvastatin, 10 mg, g-tube, Nightly  carvedilol, 6.25 mg, g-tube, BID  [START ON 2/5/2025] esomeprazole, 40 mg, g-tube, Daily before breakfast  fluticasone furoate-vilanteroL, 1 puff, inhalation, Daily  heparin (porcine), 5,000 Units, subcutaneous, q8h  [START ON 2/5/2025] polyethylene glycol, 17 g, g-tube, Daily  [START ON 2/5/2025] thiamine, 100 mg, g-tube, Daily  tiotropium, 2 puff, inhalation, Daily      Continuous medications     PRN medications  PRN medications: acetaminophen, albuterol, " benzocaine-menthol, diphenhydrAMINE, HYDROmorphone, naloxone, ondansetron ODT **OR** ondansetron, oxyCODONE, oxygen    Results for orders placed or performed during the hospital encounter of 02/03/25 (from the past 24 hours)   ECG 12 lead   Result Value Ref Range    Ventricular Rate 84 BPM    Atrial Rate 84 BPM    AR Interval 222 ms    QRS Duration 98 ms    QT Interval 404 ms    QTC Calculation(Bazett) 477 ms    P Axis 63 degrees    R Axis -89 degrees    T Axis 56 degrees    QRS Count 14 beats    Q Onset 214 ms    P Onset 103 ms    P Offset 145 ms    T Offset 416 ms    QTC Fredericia 451 ms   Potassium   Result Value Ref Range    Potassium 4.7 3.5 - 5.3 mmol/L   Phosphorus   Result Value Ref Range    Phosphorus 3.5 2.5 - 4.9 mg/dL   Basic Metabolic Panel   Result Value Ref Range    Glucose 126 (H) 74 - 99 mg/dL    Sodium 137 136 - 145 mmol/L    Potassium 4.5 3.5 - 5.3 mmol/L    Chloride 105 98 - 107 mmol/L    Bicarbonate 23 21 - 32 mmol/L    Anion Gap 14 10 - 20 mmol/L    Urea Nitrogen 22 6 - 23 mg/dL    Creatinine 1.05 0.50 - 1.30 mg/dL    eGFR 70 >60 mL/min/1.73m*2    Calcium 9.1 8.6 - 10.3 mg/dL   Magnesium   Result Value Ref Range    Magnesium 1.98 1.60 - 2.40 mg/dL   Basic Metabolic Panel   Result Value Ref Range    Glucose 110 (H) 74 - 99 mg/dL    Sodium 136 136 - 145 mmol/L    Potassium 4.6 3.5 - 5.3 mmol/L    Chloride 104 98 - 107 mmol/L    Bicarbonate 26 21 - 32 mmol/L    Anion Gap 11 10 - 20 mmol/L    Urea Nitrogen 20 6 - 23 mg/dL    Creatinine 1.10 0.50 - 1.30 mg/dL    eGFR 67 >60 mL/min/1.73m*2    Calcium 8.8 8.6 - 10.3 mg/dL   Magnesium   Result Value Ref Range    Magnesium 2.03 1.60 - 2.40 mg/dL   Basic Metabolic Panel   Result Value Ref Range    Glucose 146 (H) 74 - 99 mg/dL    Sodium 136 136 - 145 mmol/L    Potassium 4.1 3.5 - 5.3 mmol/L    Chloride 103 98 - 107 mmol/L    Bicarbonate 27 21 - 32 mmol/L    Anion Gap 10 10 - 20 mmol/L    Urea Nitrogen 17 6 - 23 mg/dL    Creatinine 1.06 0.50 - 1.30 mg/dL     eGFR 70 >60 mL/min/1.73m*2    Calcium 8.6 8.6 - 10.3 mg/dL   Magnesium   Result Value Ref Range    Magnesium 2.01 1.60 - 2.40 mg/dL     ECG 12 lead    Result Date: 2/4/2025  Sinus rhythm with 1st degree AV block Left axis deviation Abnormal ECG When compared with ECG of 15-FEB-2016 12:49, No significant change was found Confirmed by Tran Castillo ([id number]) on 2/4/2025 8:31:20 AM        This patient currently has cardiac telemetry ordered; if you would like to modify or discontinue the telemetry order, click here to go to the orders activity to modify/discontinue the order.            Malnutrition Diagnosis Status: New  Malnutrition Diagnosis: Severe malnutrition related to acute disease or injury  Related to: catabolic illness, dysphagia  As Evidenced by: severe muscle wasting, severe fat loss, <75% of estimated energy requirements in > 1 month, >2% wt loss in 1 week  I agree with the dietitian's malnutrition diagnosis.      Assessment/Plan   Assessment & Plan  Carcinoma of cardio-esophageal junction (Multi)    Chronic obstructive pulmonary disease (Multi)    Adenocarcinoma of gastroesophageal junction (Multi)    GEJ Adenocarcinoma; Dysphagia; Severe Malnutrition  s/p EGD, insertion of PEG tube  -Discussed with Dr. Luna  -stat KUB  -Keep TF at 20 mL/hr for now. Hold if cramping worsens. Further advancement pending tolerance and KUB results. Goal rate is 40 mL/hr.   -nutrition recommendations noted and appreciated  -meds to be given via PEG  -Thiamine daily  -Clear liquids as tolerated for comfort  -Tylenol and Oxycodone as ordered via PEG for pain. Dilaudid IV as ordered. Will add Toradol as alternative to narcotics.   -activity as tolerated; therapy consulted  -BMP and Mag Q12H  -SW for DC planning; referral placed for [facility]    HTN; HLD; CAD with remote MI  Remote MI in 1980s s/p PCI  -Statin therapy  -Coreg BID via PEG  -telemetry monitoring  -keep K>4 and Mag>2    COPD  POD#1: Adequate Spo2 on  RA  -continue home Breo Ellipta and Spiriva regimen  -PRN duonebs  -supplemental oxygen as needed for SpO2>90%      I spent 30 minutes in the professional and overall care of this patient.      LISETH Bryant-CNP

## 2025-02-04 NOTE — CARE PLAN
The patient's goals for the shift include  rest    The clinical goals for the shift include Patient will remain hemodynamically stable throughout shift

## 2025-02-04 NOTE — PROGRESS NOTES
Nutrition Note:  Met with pt and his family for review of syringe feeding via PEG tube. Provided pt with PEG tube Guide and RDN contact information. Discussed how to provide syringe feed, amount of feeding per day and amount of formula at each feed. Pt is currently on continuous feed of Jevity 1.5 at 20 ml/hr, will plan to walk through bolus feed with family prior to discharge once pt is tolerating TF at goal rate. Reviewed how to care for PEG tube site, signs of infection, and how to manage complications (bloating, diarrhea, nausea). All questions answered to pt and family's satisfaction. RN made aware of education provided. RDN will continue to monitor.     Nutrition Diet Education  PEG tube education    Education Documentation  PEG tube education, taught by Cristela Porter RD, GLORIA at 2/4/2025 11:43 AM.  Learner: Family, Patient  Readiness: Acceptance  Method: Explanation, Handout  Response: Verbalizes Understanding    Nutrition Related Education, taught by Cristela Porter RD, LD at 2/4/2025 11:43 AM.  Learner: Family, Patient  Readiness: Acceptance  Method: Explanation, Handout  Response: Verbalizes Understanding

## 2025-02-04 NOTE — PROGRESS NOTES
Care transitions update:   Prescription for tube feeds for [facility] DME has been completed per NP  and signed.  Sent to both [facility] , and [facility]  .  Both have accepted patient and will follow. Updated, patient, daughter and spouse at bedside.  ADOD is Thursday 2/6/25. All updates thus far have been sent.    Care transitions team will continue to follow.

## 2025-02-04 NOTE — PROGRESS NOTES
Physical Therapy    Physical Therapy Evaluation    Patient Name: Wilson Suresh  MRN: [id number]  Today's Date: 2/4/2025   Time Calculation  Start Time: 1034  Stop Time: 1050  Time Calculation (min): 16 min  818/818-A    Assessment/Plan   PT Assessment  PT Assessment Results: Decreased strength, Decreased endurance, Impaired balance, Decreased mobility, Decreased coordination, Pain  Rehab Prognosis: Good  Evaluation/Treatment Tolerance: Patient limited by fatigue, Patient limited by pain  Barriers to Participation: Comorbidities  End of Session Communication: Bedside nurse  Assessment Comment: pt with decreased mobility /gait strength balance endurance . pt to benefit from skilled PT to address deficits and improve functional mobility .  End of Session Patient Position: Bed, 3 rail up, Alarm off, not on at start of session (family present)  IP OR SWING BED PT PLAN  Inpatient or Swing Bed: Inpatient  PT Plan  Treatment/Interventions: Bed mobility, Transfer training, Gait training, Stair training, Balance training, Strengthening, Endurance training, Therapeutic exercise, Therapeutic activity  PT Plan: Ongoing PT  PT Frequency: 3 times per week  PT Discharge Recommendations: Low intensity level of continued care, Moderate intensity level of continued care  Equipment Recommended upon Discharge: Wheeled walker  PT Recommended Transfer Status: Assist x1, Assistive device  PT - OK to Discharge: Yes (once medically ready for discharge to next level of care.)    Subjective     Current Problem:  1. Adenocarcinoma of gastroesophageal junction (Multi)          General Visit Information:  General  Reason for Referral: weakness / impaired mobility  Referred By: VIDYA Pierre 2/3  Past Medical History Relevant to Rehab: HLD,HTN,MI,COPD,CHF,CA  Family/Caregiver Present: Yes (wife and daughter)  Prior to Session Communication: Bedside nurse (Cleared to see for therapy eval)  Patient Position Received: Bed, 3 rail up, Alarm off, not on at  start of session (pt has peg tube with tube feeding .)  General Comment: pt is an 85 yo male in on 2/3 for elective Peg tube placement due to obstructive adenocarcinoma  gastroesophgeal junction.    Home Living:  Home Living  Home Living Comments: pt lives at home with his wife in a 1 level home. 2 steps no rails.  pt has a tub/shower  no equipment.  Prior Level of Function:  Prior Function Per Pt/Caregiver Report  Prior Function Comments: per pt IND mobility and adls. assistance with iadls.  has a cane uses at times out of the home.   1 fall in last 3 months . stil drives.  Precautions:  Precautions  Medical Precautions: Fall precautions  Precautions Comment: peg tube placement  Vital Signs:  Objective   Pain:  Pain Assessment  Pain Assessment: 0-10  0-10 (Numeric) Pain Score: 8  Pain Type: Acute pain, Surgical pain  Pain Location: Abdomen  Cognition:  Cognition  Overall Cognitive Status: Within Functional Limits  General Assessments:  Activity Tolerance  Endurance: Tolerates less than 10 min exercise, no significant change in vital signs  Sensation  Sensation Comment: intact BLEs  Strength  Strength Comments: BLE 4/5  Coordination  Movements are Fluid and Coordinated: Yes  Static Sitting Balance  Static Sitting-Comment/Number of Minutes: good  Dynamic Sitting Balance  Dynamic Sitting-Comments: fair  Static Standing Balance  Static Standing-Comment/Number of Minutes: fair  Dynamic Standing Balance  Dynamic Standing-Comments: fair  Functional Assessments:  Bed Mobility  Bed Mobility: Yes  Bed Mobility 1  Bed Mobility 1: Supine to sitting, Sitting to supine  Level of Assistance 1: Close supervision  Bed Mobility Comments 1: SBA to EOB  increased time to complete.  Transfers  Transfer: Yes  Transfer 1  Technique 1: Sit to stand, Stand to sit  Transfer Device 1: Walker  Transfer Level of Assistance 1: Contact guard  Trials/Comments 1: CGA with FWW cues to push up and reach back with transfers  Ambulation/Gait  Training  Ambulation/Gait Training Performed: Yes  Ambulation/Gait Training 1  Surface 1: Level tile  Device 1: Rolling walker  Assistance 1: Contact guard  Quality of Gait 1: Forward flexed posture (slow gait speed)  Comments/Distance (ft) 1: 30ft x 2 with FWW cga . slow gait speed. reports of increased ABD pain and nausea .  Extremity/Trunk Assessments:  RLE   RLE : Within Functional Limits  LLE   LLE : Within Functional Limits  Outcome Measures:  [facility] Basic Mobility  Turning from your back to your side while in a flat bed without using bedrails: A little  Moving from lying on your back to sitting on the side of a flat bed without using bedrails: A little  Moving to and from bed to chair (including a wheelchair): A little  Standing up from a chair using your arms (e.g. wheelchair or bedside chair): A little  To walk in hospital room: A little  Climbing 3-5 steps with railing: Total  Basic Mobility - Total Score: 16  Goals:  Encounter Problems       Encounter Problems (Active)       PT Problem       Pt will demonstrate mod I  with bed mobility to edge of bed.         Start:  02/04/25    Expected End:  02/18/25            Pt will demonstrate mod I  with sit to stand/chair transfers with FWW.         Start:  02/04/25    Expected End:  02/18/25            Pt will ambulate 50 feet with FWW SUP .         Start:  02/04/25    Expected End:  02/18/25            Pt to demo 2 steps with SPC with SBA        Start:  02/04/25    Expected End:  02/18/25               Pain - Adult            Education Documentation  Mobility Training, taught by Jamie Harrison, PT at 2/4/2025 12:11 PM.  Learner: Patient  Readiness: Acceptance  Method: Explanation  Response: Verbalizes Understanding    Education Comments  No comments found.

## 2025-02-05 VITALS
BODY MASS INDEX: 16.33 KG/M2 | WEIGHT: 101.63 LBS | HEART RATE: 79 BPM | OXYGEN SATURATION: 95 % | SYSTOLIC BLOOD PRESSURE: 106 MMHG | DIASTOLIC BLOOD PRESSURE: 59 MMHG | RESPIRATION RATE: 18 BRPM | TEMPERATURE: 98.4 F | HEIGHT: 66 IN

## 2025-02-05 LAB
ANION GAP SERPL CALC-SCNC: 11 MMOL/L (ref 10–20)
BUN SERPL-MCNC: 18 MG/DL (ref 6–23)
CALCIUM SERPL-MCNC: 8.7 MG/DL (ref 8.6–10.3)
CHLORIDE SERPL-SCNC: 102 MMOL/L (ref 98–107)
CO2 SERPL-SCNC: 27 MMOL/L (ref 21–32)
CREAT SERPL-MCNC: 1.02 MG/DL (ref 0.5–1.3)
EGFRCR SERPLBLD CKD-EPI 2021: 73 ML/MIN/1.73M*2
GLUCOSE SERPL-MCNC: 112 MG/DL (ref 74–99)
MAGNESIUM SERPL-MCNC: 2 MG/DL (ref 1.6–2.4)
POTASSIUM SERPL-SCNC: 3.9 MMOL/L (ref 3.5–5.3)
SODIUM SERPL-SCNC: 136 MMOL/L (ref 136–145)

## 2025-02-05 PROCEDURE — 36415 COLL VENOUS BLD VENIPUNCTURE: CPT | Performed by: NURSE PRACTITIONER

## 2025-02-05 PROCEDURE — 2500000004 HC RX 250 GENERAL PHARMACY W/ HCPCS (ALT 636 FOR OP/ED): Performed by: NURSE PRACTITIONER

## 2025-02-05 PROCEDURE — 83735 ASSAY OF MAGNESIUM: CPT | Performed by: NURSE PRACTITIONER

## 2025-02-05 PROCEDURE — 97116 GAIT TRAINING THERAPY: CPT | Mod: GP

## 2025-02-05 PROCEDURE — 2060000001 HC INTERMEDIATE ICU ROOM DAILY

## 2025-02-05 PROCEDURE — 2500000001 HC RX 250 WO HCPCS SELF ADMINISTERED DRUGS (ALT 637 FOR MEDICARE OP): Performed by: NURSE PRACTITIONER

## 2025-02-05 PROCEDURE — 97530 THERAPEUTIC ACTIVITIES: CPT | Mod: GP

## 2025-02-05 PROCEDURE — 96372 THER/PROPH/DIAG INJ SC/IM: CPT | Performed by: NURSE PRACTITIONER

## 2025-02-05 PROCEDURE — 99239 HOSP IP/OBS DSCHRG MGMT >30: CPT | Performed by: NURSE PRACTITIONER

## 2025-02-05 PROCEDURE — 80048 BASIC METABOLIC PNL TOTAL CA: CPT | Performed by: NURSE PRACTITIONER

## 2025-02-05 RX ORDER — OXYCODONE HCL 5 MG/5 ML
5 SOLUTION, ORAL ORAL EVERY 6 HOURS PRN
Qty: 140 ML | Refills: 0 | Status: SHIPPED | OUTPATIENT
Start: 2025-02-05 | End: 2025-02-07 | Stop reason: SDUPTHER

## 2025-02-05 RX ORDER — ESOMEPRAZOLE MAGNESIUM 40 MG/1
GRANULE, DELAYED RELEASE ORAL
Qty: 30 PACKET | Refills: 2 | Status: SHIPPED | OUTPATIENT
Start: 2025-02-06

## 2025-02-05 RX ADMIN — KETOROLAC TROMETHAMINE 15 MG: 30 INJECTION, SOLUTION INTRAMUSCULAR at 12:39

## 2025-02-05 RX ADMIN — POLYETHYLENE GLYCOL 3350 17 G: 17 POWDER, FOR SOLUTION ORAL at 09:56

## 2025-02-05 RX ADMIN — HEPARIN SODIUM 5000 UNITS: 5000 INJECTION INTRAVENOUS; SUBCUTANEOUS at 02:36

## 2025-02-05 RX ADMIN — CARVEDILOL 6.25 MG: 6.25 TABLET, FILM COATED ORAL at 09:56

## 2025-02-05 RX ADMIN — FLUTICASONE FUROATE AND VILANTEROL TRIFENATATE 1 PUFF: 200; 25 POWDER RESPIRATORY (INHALATION) at 09:56

## 2025-02-05 RX ADMIN — HEPARIN SODIUM 5000 UNITS: 5000 INJECTION INTRAVENOUS; SUBCUTANEOUS at 09:56

## 2025-02-05 RX ADMIN — Medication 100 MG: at 09:56

## 2025-02-05 RX ADMIN — ESOMEPRAZOLE MAGNESIUM 40 MG: 20 FOR SUSPENSION ORAL at 07:00

## 2025-02-05 RX ADMIN — TIOTROPIUM BROMIDE INHALATION SPRAY 2 PUFF: 3.12 SPRAY, METERED RESPIRATORY (INHALATION) at 09:57

## 2025-02-05 ASSESSMENT — PAIN SCALES - GENERAL
PAINLEVEL_OUTOF10: 0 - NO PAIN
PAINLEVEL_OUTOF10: 3
PAINLEVEL_OUTOF10: 0 - NO PAIN
PAINLEVEL_OUTOF10: 4
PAINLEVEL_OUTOF10: 8

## 2025-02-05 ASSESSMENT — PAIN - FUNCTIONAL ASSESSMENT
PAIN_FUNCTIONAL_ASSESSMENT: 0-10

## 2025-02-05 ASSESSMENT — COGNITIVE AND FUNCTIONAL STATUS - GENERAL
WALKING IN HOSPITAL ROOM: A LITTLE
TURNING FROM BACK TO SIDE WHILE IN FLAT BAD: A LITTLE
CLIMB 3 TO 5 STEPS WITH RAILING: A LOT
MOVING TO AND FROM BED TO CHAIR: A LITTLE
MOVING TO AND FROM BED TO CHAIR: A LITTLE
MOBILITY SCORE: 16
CLIMB 3 TO 5 STEPS WITH RAILING: TOTAL
MOVING FROM LYING ON BACK TO SITTING ON SIDE OF FLAT BED WITH BEDRAILS: A LITTLE
STANDING UP FROM CHAIR USING ARMS: A LITTLE
TOILETING: A LITTLE
DAILY ACTIVITIY SCORE: 23
WALKING IN HOSPITAL ROOM: A LITTLE
TURNING FROM BACK TO SIDE WHILE IN FLAT BAD: A LITTLE
MOBILITY SCORE: 19

## 2025-02-05 NOTE — DISCHARGE INSTRUCTIONS
Tube Feedin ml bolus (1 carton) of Jevity 1.5 or equivalent 4x/day. Flush with 60 ml water before and after each feed. Flush with additional 180 ml water between feeds 2x/day.   Clear liquids as tolerated for comfort.  Activity as tolerated.  Clean PEG site with soap and water. Pt may shower but no soaking in water while PEG in place. Observe for signs of infection (redness, swelling, increased pain, purulent drainage, and/or fever) and notify Dr. Luna's office should concern arise.

## 2025-02-05 NOTE — PROGRESS NOTES
Physical Therapy    Physical Therapy Treatment    Patient Name: Wilson Suresh  MRN: [id number]  Department: [facility]  Room: [facility]818-  Today's Date: 2/5/2025  Time Calculation  Start Time: 1135  Stop Time: 1200  Time Calculation (min): 25 min    Assessment/Plan   PT Assessment  PT Assessment Results: Decreased strength, Decreased endurance, Impaired balance, Decreased mobility, Decreased coordination, Pain  Rehab Prognosis: Good  Evaluation/Treatment Tolerance: Patient limited by fatigue  End of Session Communication: Bedside nurse  Assessment Comment: pt with increased confusion this date. needed increased safety cues with gait and transfers  End of Session Patient Position: Up in chair, Alarm on (family present)     PT Plan  Treatment/Interventions: Bed mobility, Transfer training, Gait training, Stair training, Balance training, Strengthening, Endurance training, Therapeutic exercise, Therapeutic activity  PT Plan: Ongoing PT  PT Frequency: 3 times per week  PT Discharge Recommendations: Low intensity level of continued care, Moderate intensity level of continued care  Equipment Recommended upon Discharge: Wheeled walker  PT Recommended Transfer Status: Assist x1, Assistive device  PT - OK to Discharge: Yes (once medically ready for discharge to next level of care.)    General Visit Information:   PT  Visit  PT Received On: 02/05/25  Response to Previous Treatment: Patient reporting fatigue but able to participate.  General  Reason for Referral: weakness / impaired mobility  Referred By: VIDYA Pierre 2/3  Past Medical History Relevant to Rehab: HLD,HTN,MI,COPD,CHF,CA  Family/Caregiver Present: Yes (wife and daughter)  Prior to Session Communication: Bedside nurse (cleared to see reports increased confusion over night)  Patient Position Received: Bed, 3 rail up, Alarm on (family present.  alarm on bed.  feeding disconnected from peg tube by RN)  General Comment: pt agreeable to treat. pts daughter reports pt was up all  night and more confused today .   pt reports some pain around peg tube area.  3/10  Subjective   Precautions:  Precautions  Medical Precautions: Fall precautions  Precautions Comment: new peg tube placement  Objective   Pain:  Pain Assessment  Pain Assessment: 0-10  0-10 (Numeric) Pain Score: 3  Pain Location: Abdomen  Cognition:  Cognition  Overall Cognitive Status: Impaired  Orientation Level: Disoriented to situation, Disoriented to place  Impulsive: Moderately  Processing Speed: Delayed  Coordination:  Movements are Fluid and Coordinated: Yes  Postural Control:  Static Sitting Balance  Static Sitting-Comment/Number of Minutes: fair  Dynamic Sitting Balance  Dynamic Sitting-Comments: fair  Static Standing Balance  Static Standing-Comment/Number of Minutes: fair  Dynamic Standing Balance  Dynamic Standing-Comments: fair  Activity Tolerance:  Activity Tolerance  Endurance: Tolerates less than 10 min exercise, no significant change in vital signs  Treatments:  Bed Mobility  Bed Mobility: Yes  Bed Mobility 1  Bed Mobility 1: Supine to sitting  Level of Assistance 1: Contact guard  Bed Mobility Comments 1: cga cues to scoot out to EOB  Ambulation/Gait Training  Ambulation/Gait Training Performed: Yes  Ambulation/Gait Training 1  Surface 1: Level tile  Device 1: Rolling walker  Assistance 1: Contact guard  Quality of Gait 1: Forward flexed posture  Comments/Distance (ft) 1: slow gait . flexed knees 50ft x2 with FWW cga . cues to stay close to walker.  Transfers  Transfer: Yes  Transfer 1  Technique 1: Sit to stand, Stand to sit  Transfer Device 1: Walker  Transfer Level of Assistance 1: Contact guard  Trials/Comments 1: cga with FWW cues to push up and reach back with transfers.  Outcome Measures:  [facility] Basic Mobility  Turning from your back to your side while in a flat bed without using bedrails: A little  Moving from lying on your back to sitting on the side of a flat bed without using bedrails: A little  Moving to  and from bed to chair (including a wheelchair): A little  Standing up from a chair using your arms (e.g. wheelchair or bedside chair): A little  To walk in hospital room: A little  Climbing 3-5 steps with railing: Total  Basic Mobility - Total Score: 16  Encounter Problems       Encounter Problems (Active)       PT Problem       Pt will demonstrate mod I  with bed mobility to edge of bed.   (Progressing)       Start:  02/04/25    Expected End:  02/18/25            Pt will demonstrate mod I  with sit to stand/chair transfers with FWW.   (Progressing)       Start:  02/04/25    Expected End:  02/18/25            Pt will ambulate 50 feet with FWW SUP .   (Progressing)       Start:  02/04/25    Expected End:  02/18/25            Pt to demo 2 steps with SPC with SBA  (Progressing)       Start:  02/04/25    Expected End:  02/18/25               Pain - Adult

## 2025-02-05 NOTE — DISCHARGE SUMMARY
Discharge Diagnosis  Carcinoma of cardio-esophageal junction (Multi)    Issues Requiring Follow-Up  N/a     Test Results Pending At Discharge  Pending Labs       No current pending labs.            Hospital Course  Mr. Suresh is an 84 year old male with PMHx of HTN, HLD, CAD with hx of MI in 1980s s/p PCI, COPD, hx of bladder cancer, and hx of TIA. PT recently found to have GEJ adenocarcinoma. He began noting dysphagia over the past 6 months that progressed to where even water will cause him to regurgitate. He was referred to thoracic surgery for consideration of PEG placement.     On February 3, 2025 pt presented to [facility] and underwent EGD, insertion of PEG tube. He tolerated the procedure well. Recovered in PACU and then admitted to intermediate unit. Dietician consulted; appreciate recommendations. Tube feed initiated at 20 mL/hr post PEG placement with instruction to increase by 10 mL/hr Q12H until goal rate of 40 mL/hr.     The post operative period was complicated by overnight delirium (sundowner's). Pt reportedly quite confused, pulling at medical equipment, and needing frequent reorientation. Tube feeding was stopped due to fear pt would pull PEG. Pt had not been given narcotics since 1030 AM. Pt was closely monitored for safety and no acute events happened during this time. The following morning pt alert and oriented and back to baseline. He remembered feeling confused but could not specify or describe events from the previous night. There were no further confusion episodes during admission.     Tube feed was initiated POD#1 6 hours after procedure at 20 mL/hr. Morning of POD#1 TF advanced to 30 mL/hr. Soon after, pt complained of worsening abdominal pain and TF decreased back to starting rate. Pt continued to report pain and TF held. KUB revealed constipation. Suppository administered resulting in large BM. Tube feed was resumed that evening. The following day, pt determined to be appropriate for discharge  to home. Continuous tube feed was held in the AM to allow for bolus teaching prior to DC. Around lunch time, the dietician administered the bolus feed and educated family on process. Pt was able to verbalize and demonstrate understanding. Additionally, medication administration was also discussed and demonstrated with family. Family again demonstrated understanding.     At time of discharge, pt is afebrile and HDS. Maintaining sinus rhythm. Maintaining adequate SpO2 on RA. Pain well controlled. Tolerating TF as discussed above. Pt able to take clear liquids for comfort as tolerated. Had large BM following suppository and another BM morning of DC. Pt was discharged to home in HDS. He can follow up with Dr. Luna as needed and should contact office if there are questions or concerns.     Pertinent Physical Exam At Time of Discharge  Physical Exam  Vitals and nursing note reviewed.   Constitutional:       General: He is not in acute distress.     Appearance: Normal appearance. He is ill-appearing.      Comments: Frail   HENT:      Head: Normocephalic and atraumatic.      Right Ear: External ear normal.      Left Ear: External ear normal.      Nose: Nose normal.      Mouth/Throat:      Mouth: Mucous membranes are moist.      Pharynx: Oropharynx is clear.   Eyes:      Extraocular Movements: Extraocular movements intact.      Pupils: Pupils are equal, round, and reactive to light.   Cardiovascular:      Rate and Rhythm: Normal rate and regular rhythm.      Pulses: Normal pulses.   Pulmonary:      Effort: Pulmonary effort is normal.      Breath sounds: Normal breath sounds.   Abdominal:      General: Abdomen is flat. Bowel sounds are normal.      Palpations: Abdomen is soft.      Comments: PEG intact. Tolerating TF. Last BM 2/5   Musculoskeletal:         General: Normal range of motion.      Cervical back: Normal range of motion.      Comments: Generalized muscle wasting   Skin:     General: Skin is warm and dry.    Neurological:      General: No focal deficit present.      Mental Status: He is alert and oriented to person, place, and time.   Psychiatric:         Mood and Affect: Mood normal.         Behavior: Behavior normal.         Home Medications     Medication List      START taking these medications     esomeprazole 40 mg packet; Commonly known as: NexIUM; Dissolve one   packet in 15 mL water and administer via PEG tube. Flush PEG after   administration. Do not fill before February 6, 2025.; Start taking on:   February 6, 2025   oxyCODONE 5 mg/5 mL solution; Commonly known as: Roxicodone; 5 mL (5 mg)   by g-tube route every 6 hours if needed for moderate pain (4 - 6).;   Replaces: oxyCODONE 5 mg immediate release capsule     CONTINUE taking these medications     aspirin-calcium carbonate 81 mg-300 mg calcium(777 mg) tablet   atorvastatin 10 mg tablet; Commonly known as: Lipitor; Take 1 tablet (10   mg) by mouth once daily at bedtime.   carvedilol 6.25 mg tablet; Commonly known as: Coreg; Take 1 tablet (6.25   mg) by mouth 2 times a day.   Dulera 200-5 mcg/actuation inhaler; Generic drug: mometasone-formoterol   ondansetron 4 mg tablet; Commonly known as: Zofran   Spiriva with HandiHaler 18 mcg inhalation capsule; Generic drug:   tiotropium     STOP taking these medications     oxyCODONE 5 mg immediate release capsule; Commonly known as: Oxy-IR;   Replaced by: oxyCODONE 5 mg/5 mL solution   pantoprazole 40 mg EC tablet; Commonly known as: ProtoNix       Outpatient Follow-Up  Future Appointments   Date Time Provider Department Center   8/1/2025 11:45 AM Kathy Farooq MD [id number]       Kelly Pierre, APRN-CNP

## 2025-02-05 NOTE — DOCUMENTATION CLARIFICATION NOTE
"    PATIENT:               ADRIANA GORE  ACCT #:                  [id number]  MRN:                       [id number]  :                       1940  ADMIT DATE:       2/3/2025 5:29 AM  DISCH DATE:  RESPONDING PROVIDER #:        [id number]          PROVIDER RESPONSE TEXT:    Chronic Diastolic Congestive Heart Failure    CDI QUERY TEXT:    Clarification    Instruction:    Based on your assessment of the patient and the clinical information, please provide the requested documentation by clicking on the appropriate radio button and enter any additional information if prompted.    Question: Please further clarify the type and acuity of congestive heart failure    When answering this query, please exercise your independent professional judgment. The fact that a question is being asked, does not imply that any particular answer is desired or expected.    The patient's clinical indicators include:  Clinical Information: Pt presents electively for EGD and Peg tube placement d/t GEJ adenocarcinoma    Clinical Indicators:    Per the H/P dated 25 - \"84 y.o. male with a pmhx of  heart failure. I reviewed the ECHO from 25. It showed EF of 50%, RV function is normal, RVSP 44 mmHg\"    Treatment: home med of carvedilol    Risk Factors: HTN  Options provided:  -- Chronic Diastolic Congestive Heart Failure  -- Other - I will add my own diagnosis  -- Refer to Clinical Documentation Reviewer    Query created by: Sudhir Hedrick on 2025 10:22 AM      Electronically signed by:  VLADIMIR HOLLIDAY MD 2025 10:35 AM          "

## 2025-02-05 NOTE — PROGRESS NOTES
Notified plan is for dc home today , sent up date to [facility] and also [facility].  Home care orders noted and sent via care port.      All orders , after visit summary and dc summary have been sent to [facility] and [facility].   Contact information has been provided to family if needed for [facility]. Called and notified PCP Dr Griffith office of dc plans , their office will follow up with patient.    Plan is for dc home today, no further needs identified.

## 2025-02-05 NOTE — PROGRESS NOTES
Nutrition Note:  Reviewed syringe feed with pt and his wife, daughter also present. Wife provided bolus feed via syringe with RD present - 60 ml water flushed prior to feed, pt tolerated 240 ml bolus of Jevity 1.5 over 15 minutes, tube flushed after bolus complete with 60 ml water. Pt denies nausea, bloating or discomfort. Pt's DME is [employer] - was provided with [employer] RDN contact information by [facility], this RDN's contact information also provided to pt's wife, encouraged wife to call with any questions.

## 2025-02-07 DIAGNOSIS — G89.3 CHRONIC PAIN DUE TO NEOPLASM: ICD-10-CM

## 2025-02-07 DIAGNOSIS — G89.18 ACUTE POST-OPERATIVE PAIN: ICD-10-CM

## 2025-02-07 RX ORDER — OXYCODONE HCL 5 MG/5 ML
5 SOLUTION, ORAL ORAL EVERY 6 HOURS PRN
Qty: 140 ML | Refills: 0 | Status: SHIPPED | OUTPATIENT
Start: 2025-02-07

## 2025-04-21 ENCOUNTER — APPOINTMENT (OUTPATIENT)
Dept: CARDIOLOGY | Facility: CLINIC | Age: 85
End: 2025-04-21
Payer: MEDICARE

## 2025-08-01 ENCOUNTER — APPOINTMENT (OUTPATIENT)
Dept: CARDIOLOGY | Facility: CLINIC | Age: 85
End: 2025-08-01
Payer: MEDICARE

## (undated) DEVICE — PROTECTOR, NERVE, ULNAR, PINK

## (undated) DEVICE — TUBING, SUCTION, 6MM X 10, CLEAN N-COND

## (undated) DEVICE — SOLUTION, IRRIGATION, STERILE WATER, 1000 ML, POUR BOTTLE

## (undated) DEVICE — STRAP, VELCRO, BODY, 4 X 60IN, NS

## (undated) DEVICE — APPLICATOR, CHLORAPREP, W/ORANGE TINT, 26ML

## (undated) DEVICE — TOWEL PACK, STERILE, 16X24, XRAY DETECTABLE, BLUE, 4/PK